# Patient Record
Sex: MALE | Race: WHITE | Employment: OTHER | URBAN - METROPOLITAN AREA
[De-identification: names, ages, dates, MRNs, and addresses within clinical notes are randomized per-mention and may not be internally consistent; named-entity substitution may affect disease eponyms.]

---

## 2024-08-29 ENCOUNTER — TELEPHONE (OUTPATIENT)
Dept: WOUND CARE | Facility: HOSPITAL | Age: 74
End: 2024-08-29

## 2024-08-29 ENCOUNTER — OFFICE VISIT (OUTPATIENT)
Dept: WOUND CARE | Facility: HOSPITAL | Age: 74
End: 2024-08-29
Attending: NURSE PRACTITIONER
Payer: MEDICARE

## 2024-08-29 VITALS
RESPIRATION RATE: 18 BRPM | WEIGHT: 170 LBS | BODY MASS INDEX: 26.68 KG/M2 | SYSTOLIC BLOOD PRESSURE: 139 MMHG | HEART RATE: 81 BPM | HEIGHT: 67 IN | OXYGEN SATURATION: 93 % | TEMPERATURE: 99 F | DIASTOLIC BLOOD PRESSURE: 77 MMHG

## 2024-08-29 DIAGNOSIS — E11.65 UNCONTROLLED TYPE 2 DIABETES MELLITUS WITH HYPERGLYCEMIA, WITH LONG-TERM CURRENT USE OF INSULIN (HCC): ICD-10-CM

## 2024-08-29 DIAGNOSIS — L97.529 DIABETIC ULCER OF LEFT GREAT TOE (HCC): ICD-10-CM

## 2024-08-29 DIAGNOSIS — L97.922 NON-PRESSURE CHRONIC ULCER OF LEFT LOWER LEG WITH FAT LAYER EXPOSED (HCC): Primary | ICD-10-CM

## 2024-08-29 DIAGNOSIS — Z79.4 UNCONTROLLED TYPE 2 DIABETES MELLITUS WITH HYPERGLYCEMIA, WITH LONG-TERM CURRENT USE OF INSULIN (HCC): ICD-10-CM

## 2024-08-29 DIAGNOSIS — E11.621 DIABETIC ULCER OF LEFT GREAT TOE (HCC): ICD-10-CM

## 2024-08-29 PROCEDURE — 99204 OFFICE O/P NEW MOD 45 MIN: CPT | Performed by: NURSE PRACTITIONER

## 2024-08-29 PROCEDURE — 97597 DBRDMT OPN WND 1ST 20 CM/<: CPT | Performed by: NURSE PRACTITIONER

## 2024-08-29 RX ORDER — ROSUVASTATIN CALCIUM 40 MG/1
40 TABLET, COATED ORAL NIGHTLY
COMMUNITY
Start: 2024-08-17

## 2024-08-29 RX ORDER — AMOXICILLIN AND CLAVULANATE POTASSIUM 500; 125 MG/1; MG/1
1 TABLET, FILM COATED ORAL 2 TIMES DAILY WITH MEALS
COMMUNITY
End: 2024-08-29 | Stop reason: ALTCHOICE

## 2024-08-29 RX ORDER — INSULIN LISPRO 100 [IU]/ML
8 INJECTION, SOLUTION INTRAVENOUS; SUBCUTANEOUS 3 TIMES DAILY
COMMUNITY
Start: 2024-08-17

## 2024-08-29 RX ORDER — PEN NEEDLE, DIABETIC 32GX 5/32"
1 NEEDLE, DISPOSABLE MISCELLANEOUS 4 TIMES DAILY
COMMUNITY
Start: 2024-08-17

## 2024-08-29 RX ORDER — CLOPIDOGREL BISULFATE 75 MG/1
75 TABLET ORAL DAILY
COMMUNITY
Start: 2024-08-17

## 2024-08-29 RX ORDER — AMLODIPINE BESYLATE 5 MG/1
5 TABLET ORAL DAILY
COMMUNITY
Start: 2024-08-17

## 2024-08-29 RX ORDER — LOSARTAN POTASSIUM AND HYDROCHLOROTHIAZIDE 25; 100 MG/1; MG/1
1 TABLET ORAL DAILY
COMMUNITY
Start: 2024-08-17

## 2024-08-29 RX ORDER — GABAPENTIN 100 MG/1
100 CAPSULE ORAL 3 TIMES DAILY
COMMUNITY
Start: 2024-08-06

## 2024-08-29 RX ORDER — ERGOCALCIFEROL 1.25 MG/1
50000 CAPSULE, LIQUID FILLED ORAL WEEKLY
COMMUNITY
Start: 2024-08-17

## 2024-08-29 RX ORDER — INSULIN GLARGINE 100 [IU]/ML
66 INJECTION, SOLUTION SUBCUTANEOUS DAILY
COMMUNITY

## 2024-08-29 NOTE — PROGRESS NOTES
Chief Complaint   Patient presents with    Wound Care     Left lateral ankle. Started as a blister then got worse.   Pt bs are at 250     HPI:   8/29/24: 74 yr old male here today with his daughter for evaluation of left lateral ankle wounds. They are [place] speaking, language line was used for translation. Reports wounds have been present for at least 6 months. He went to [facility] 8/6/24 for evaluation of the wounds. He was treated for wound infection with a course of Augmentin 875mg. Pt says the redness around the wound has improved since then. He was previously receiving treatment for the wound in [place]. He has tried medi-honey and what appears to be the equivalent to Santyl. Reports both treatments caused increased pain at the wound site. Since he was at the ED, his daughter has been cleaning with an OTC wound cleanser and wounds have been left open to air. He has been showering regularly. Additionally, he has wound on his left great toe that they think has healed, occasionally she notices a drop of drainage. Pt reports he currently has no pain at the wound site, but will experience sharp pain at the wound site at night time. He was given Gabapentin by [facility], but this has not helped. He denies known history of stroke, blood clots, CHF, PAD, Afib. Reports he takes Plavix for poor circulation. Reports taking Insulin as prescribed.      Medical Problems:  T2DM (uncontrolled)  HTN  Hyperlipidemia    Pertinent Labs:     Ref Range & Units 8/5/24 2200   WBC  3.5 - 10.5 K/UL 5.1   RBC  4.20 - 5.80 M/UL 4.65   HGB  13.0 - 17.5 GM/DL 13.5   HCT  38.0 - 54.0 % 40.9     BUN  7 - 22 MG/DL 25 High    CREATININE  0.6 - 1.4 MG/DL 1.07   GLUCOSE  70 - 100 MG/ High      ESTIMATED GFR  >59 ML/MIN/1.73M2 73     SED RATE  0 - 25 MM 19     C REACTIVE PROTEIN  <8.1 MG/L 2.0     Outside Imaging:  EXAM: AP and lateral views of the left tibia and fibula   DATE: 8/5/2024 11:49 PM   CLINICAL INDICATION: ulceration two months,  treated in [place], seems worse to   family.   COMPARISON: None.   FINDINGS:   Bones:There is no acute fracture or dislocation.   Joints: Joint spaces are intact without degenerative proliferation.   Soft Tissues: Soft tissue ulceration over the lateral aspect of the distal lower   extremity.   IMPRESSION:   No acute fracture or dislocation of the left tibia or fibula.  No osseous   erosion.  Soft tissue ulceration distally.     EXAM: Non-Standing AP, lateral and oblique views of the left foot   DATE: 8/5/2024 11:49 PM   CLINICAL INDICATION: ulcerations left foot at base of great toe and left lower   leg distally/laterally.    COMPARISON: None.   FINDINGS:   Bones: There is no acute fracture or dislocation.    Joints: Joint spaces show mild degenerative proliferation.   Soft Tissues: Focal soft tissue swelling at the medial aspect of the hallux   distal phalanx.     IMPRESSION:   No acute fracture-dislocation left foot.  No osseous erosion.  Soft tissue   swelling.       Current Outpatient Medications:     amLODIPine 5 MG Oral Tab, Take 1 tablet (5 mg total) by mouth daily., Disp: , Rfl:     clopidogrel 75 MG Oral Tab, Take 1 tablet (75 mg total) by mouth daily., Disp: , Rfl:     ergocalciferol 1.25 MG (37698 UT) Oral Cap, Take 1 capsule (50,000 Units total) by mouth once a week., Disp: , Rfl:     gabapentin 100 MG Oral Cap, Take 1 capsule (100 mg total) by mouth 3 (three) times daily., Disp: , Rfl:     LANTUS SOLOSTAR 100 UNIT/ML Subcutaneous Solution Pen-injector, Inject 66 Units into the skin daily., Disp: , Rfl:     Insulin Lispro, 1 Unit Dial, 100 UNIT/ML Subcutaneous Solution Pen-injector, Inject 8 Units as directed 3 (three) times daily., Disp: , Rfl:     BD PEN NEEDLE SHANNON 2ND GEN 32G X 4 MM Does not apply Misc, 1 each 4 (four) times daily., Disp: , Rfl:     losartan-hydroCHLOROthiazide 100-25 MG Oral Tab, Take 1 tablet by mouth daily., Disp: , Rfl:     rosuvastatin 40 MG Oral Tab, Take 1 tablet (40 mg total)  by mouth nightly., Disp: , Rfl:     Allergies:  No Known Allergies     REVIEW OF SYSTEMS:   Review of Systems   Constitutional:  Negative for activity change, appetite change, chills, fatigue and fever.   Respiratory:  Negative for shortness of breath.    Cardiovascular:  Negative for chest pain.   Gastrointestinal:  Negative for abdominal pain.   Musculoskeletal:  Positive for gait problem (ambulates with cane).   Skin:  Positive for wound. Negative for rash.   Neurological:  Negative for dizziness, numbness and headaches.   Psychiatric/Behavioral:          Daughter reports he sometimes gets depressed by non-healing wound     PHYSICAL EXAM:   /77   Pulse 81   Temp 98.8 °F (37.1 °C)   Resp 18   Ht 67\"   Wt 170 lb   SpO2 93%   BMI 26.63 kg/m²    Estimated body mass index is 26.63 kg/m² as calculated from the following:    Height as of this encounter: 67\".    Weight as of this encounter: 170 lb.   Vital signs reviewed.Appears stated age, well groomed.    Physical Exam  Constitutional:       General: He is not in acute distress.     Appearance: Normal appearance. He is not ill-appearing.   HENT:      Head: Normocephalic and atraumatic.   Cardiovascular:      Rate and Rhythm: Normal rate.      Pulses:           Dorsalis pedis pulses are 2+ on the right side and 2+ on the left side.        Posterior tibial pulses are 2+ on the right side and 2+ on the left side.      Comments: 24 handheld doppler findings of biphasic wave sounds at right DP and PT pulses, monophasic wave sounds left DP and PT.    Pulmonary:      Effort: Pulmonary effort is normal.   Musculoskeletal:      Right lower leg: Edema present.      Left lower le+ Pitting Edema present.   Skin:     General: Skin is warm and dry.      Capillary Refill: Capillary refill takes 2 to 3 seconds.      Findings: Wound (left lateral ankle, left 1st toe; see wound care flowsheet) present. No rash.   Neurological:      General: No focal deficit  present.      Mental Status: He is alert and oriented to person, place, and time.   Psychiatric:         Mood and Affect: Mood normal.         Behavior: Behavior normal.       Wound 08/29/24 3 Ankle Left;Lateral;Proximal (Active)   Date First Assessed/Time First Assessed: 08/29/24 1106    Wound Number (Wound Clinic Only): 3  Location: Ankle  Wound Location Orientation: Left;Lateral;Proximal      Assessments 8/29/2024 11:16 AM   Wound Image      Site Assessment Moist;Yellow   Closure Not approximated   Drainage Amount Scant   Drainage Description Yellow   Treatments Cleansed;Wound ;Saline   Dressing Joanne;Tape   Dressing Changed New   Dressing Status Dry;Intact;Clean   Wound Length (cm) 1.9 cm   Wound Width (cm) 1.6 cm   Wound Surface Area (cm^2) 3.04 cm^2   Wound Depth (cm) 0 cm   Wound Volume (cm^3) 0 cm^3   Margins Attached edges   Non-staged Wound Description Full thickness   Angelica-wound Assessment Clean;Dry;Blanchable erythema;Painful   Wound Bed Granulation (%) 0 %   Wound Bed Epithelium (%) 0 %   Wound Bed Slough (%) 100 %   Wound Bed Eschar (%) 0 %   Wound Bed Fibrin (%) 0 %   State of Healing Non-healing   Wound Odor None       No associated orders.       Wound 08/29/24 1 Ankle Left;Lateral;Distal (Active)   Date First Assessed/Time First Assessed: 08/29/24 1115    Wound Number (Wound Clinic Only): 1  Location: (c) Ankle  Wound Location Orientation: Left;Lateral;Distal      Assessments 8/29/2024 11:16 AM   Wound Image      Site Assessment Moist;Yellow;Pink;Red   Closure Not approximated   Drainage Amount Small   Drainage Description Yellow   Treatments Cleansed;Saline;Wound    Dressing Joanne;Tape   Dressing Changed New   Dressing Status Clean;Dry;Intact   Wound Length (cm) 1.4 cm   Wound Width (cm) 1.3 cm   Wound Surface Area (cm^2) 1.82 cm^2   Wound Depth (cm) 0.2 cm   Wound Volume (cm^3) 0.364 cm^3   Margins Well-defined edges   Non-staged Wound Description Full thickness   Angelica-wound  Assessment Dry;Intact;Blanchable erythema;Painful   Wound Granulation Tissue Pink;Red   Wound Bed Granulation (%) 100 %   Wound Bed Epithelium (%) 0 %   Wound Bed Slough (%) 20 %   Wound Bed Eschar (%) 0 %   Wound Bed Fibrin (%) 0 %   State of Healing Non-healing   Wound Odor None       No associated orders.       Wound 08/29/24 2 Toe (Comment which one) Left;Dorsal (Active)   Date First Assessed/Time First Assessed: 08/29/24 1132    Wound Number (Wound Clinic Only): 2  Location: Toe (Comment which one)  Wound Location Orientation: Left;Dorsal      Assessments 8/29/2024 11:16 AM   Wound Image     Site Assessment Yellow;Moist;Pink   Closure Not approximated   Drainage Amount Small   Drainage Description Yellow   Treatments Cleansed;Saline;Wound    Dressing Xeroform;Bandaid   Dressing Changed New   Dressing Status Clean;Dry;Intact   Wound Length (cm) 0.1 cm   Wound Width (cm) 0.1 cm   Wound Surface Area (cm^2) 0.01 cm^2   Wound Depth (cm) 0.1 cm   Wound Volume (cm^3) 0.001 cm^3   Margins Attached edges   Angelica-wound Assessment Clean;Dry;Maceration   Wound Granulation Tissue Pink   Wound Bed Granulation (%) 80 %   Wound Bed Epithelium (%) 0 %   Wound Bed Slough (%) 20 %   Wound Bed Eschar (%) 0 %   Wound Bed Fibrin (%) 0 %   State of Healing Non-healing   Wound Odor None       No associated orders.       Compression Wrap 08/29/24 Leg Left;Lower (Active)   Placement Date: 08/29/24   Location: Leg  Wound Location Orientation: Left;Lower      Assessments 8/29/2024 11:16 AM   Response to Treatment Well tolerated   Compression Layers Single   Compression Product Type Tubigrip/Spanda   Dressing Applied Yes   Compression Wrap Location Toes to Knee   Compression Wrap Status Clean;Dry;Intact       No associated orders.        Lower Extremity Measurements   Calf Ankle Foot Heel to Knee Knee to Thigh   Right                                Left Left Calf from:: Heel  Calf Left cm:: 36.5 Left Ankle from:: Heel  Left Ankle  cm:: 23    Left Foot from:: Great toe  Left Foot cm:: 24.5 Left Heel to Knee: 43          Debridement Left;Lateral;Proximal Ankle   Wound 08/29/24 3 Ankle Left;Lateral;Proximal    Performed by: Melany Hathaway APRN  Authorized by: Melany Hathaway APRN      Consent   Consent obtained? verbal  Consent given by: patient  Risks discussed? procedural risks discussed  Immediately prior to the procedure a time out was called and the performing provider verified the correct patient, procedure, equipment, support staff, and site/side marked as required.    Debridement Details  Performed by: NP  Debridement type: conservative sharp  Pain control: benzocaine 20%  Pain control administration type: topical    Pre-debridement measurements  Length (cm): 1.9  Width (cm): 1.6  Depth (cm): 0.1  Surface Area (cm^2): 3.04    Post-debridement measurements  Length (cm): 1.9  Width (cm): 1.6  Depth (cm): 0.1  Percent debrided: 100%  Surface Area (cm^2): 3.04  Area Debrided (cm^2): 3.04  Volume (cm^3): 0.3    Devitalized tissue debrided: biofilm, fibrin and slough  Instrument(s) utilized: blade and forceps  Bleeding: small  Hemostasis obtained with: pressure  Procedural pain (0-10): 0  Post-procedural pain: 2   Response to treatment: procedure was tolerated well    Debridement Left;Lateral;Distal Ankle   Wound 08/29/24 1 Ankle Left;Lateral;Distal    Performed by: Melany Hathaway APRN  Authorized by: Melany Hathaway APRN      Consent   Consent obtained? verbal  Consent given by: patient  Risks discussed? procedural risks discussed  Time out called at 8/29/2024 2:16 PM  Immediately prior to the procedure a time out was called and the performing provider verified the correct patient, procedure, equipment, support staff, and site/side marked as required.    Debridement Details  Performed by: NP  Debridement type: conservative sharp  Pain control: benzocaine 20%  Pain control administration type: topical    Pre-debridement  measurements  Length (cm): 1.4  Width (cm): 1.3  Depth (cm): 0.2  Surface Area (cm^2): 1.82    Post-debridement measurements  Length (cm): 1.4  Width (cm): 1.3  Depth (cm): 0.2  Percent debrided: 100%  Surface Area (cm^2): 1.82  Area Debrided (cm^2): 1.82  Volume (cm^3): 0.36    Devitalized tissue debrided: biofilm, fibrin and slough  Instrument(s) utilized: blade and forceps  Bleeding: none  Hemostasis obtained with: not applicable  Procedural pain (0-10): 0  Post-procedural pain: 0   Response to treatment: procedure was tolerated well      ASSESSMENT AND PLAN:      1. Open wound of left lower leg, initial encounter    2. Diabetic ulcer of left great toe (HCC)    3. Uncontrolled type 2 diabetes mellitus with hyperglycemia, with long-term current use of insulin (HCC)    Chronic full thickness wounds on lateral left lower leg, present for ~6 months. Wounds are moist with slough and granulation present in wound bed. Periwound with blanchable erythema, edema.  Wounds were conservatively debrided (see procedure notes). Following debridement wounds were cleansed with Vashe. There are no s/sx of skin infection--pt was advised to monitor for these and to go to ED if he develops any sx. Hindering factors for wound healing include T2DM, chronic wound, peripheral edema.   Discussed the process of wound healing, creating/maintaining a moist, clean environment for wound healing.   Wound Care:  -Cleanse wound with wound cleanser  -Zinc paste to periwound  -Enluxtra (primary), rolled gauze (secondary) secured w/tape  -Medigrip E for edema management    Handout provided on nutrition for wound healing, encourages reduced carbohydrate intake, increased protein intake, and healthy diet. Recommended increase vitamin intake (either with foods or vitamin supplements), need vitamin A, Bs, C, D and zinc for wound healing.   Discussed importance of improving blood sugar control and how elevated BG delays wound healing  Risks, benefits, and  alternatives of current treatment plan discussed in detail.  Questions and concerns addressed. Red flags to RTC or ED reviewed.  Patient (or parent) agrees to plan.      Return in about 1 week (around 9/5/2024) for APN visit x 30 mins.    I spent 60 minutes with the patient. This time included:  preparing to see the patient (eg, review notes and recent diagnostics), seeing the patient, performing a medically appropriate examination and/or evaluation, counseling and educating the patient, and documenting in the record.    NOTE TO PATIENT: The 21st Century Cures Act makes clinical notes like these available to patients in the interest of transparency. Clinical notes are medical documents used by physicians and care providers to communicate with each other. These documents include medical language and terminology, abbreviations, and treatment information that may sound technical and at times possibly unfamiliar. In addition, at times, the verbiage may appear blunt or direct. These documents are one tool providers use to communicate relevant information and clinical opinions of the care providers in a way that allows common understanding of the clinical context.

## 2024-09-05 ENCOUNTER — OFFICE VISIT (OUTPATIENT)
Dept: WOUND CARE | Facility: HOSPITAL | Age: 74
End: 2024-09-05
Attending: NURSE PRACTITIONER
Payer: MEDICAID

## 2024-09-05 VITALS
SYSTOLIC BLOOD PRESSURE: 152 MMHG | RESPIRATION RATE: 18 BRPM | HEART RATE: 79 BPM | DIASTOLIC BLOOD PRESSURE: 91 MMHG | TEMPERATURE: 98 F | OXYGEN SATURATION: 94 %

## 2024-09-05 DIAGNOSIS — L97.922 NON-PRESSURE CHRONIC ULCER OF LEFT LOWER LEG WITH FAT LAYER EXPOSED (HCC): Primary | ICD-10-CM

## 2024-09-05 DIAGNOSIS — Z79.4 UNCONTROLLED TYPE 2 DIABETES MELLITUS WITH HYPERGLYCEMIA, WITH LONG-TERM CURRENT USE OF INSULIN (HCC): ICD-10-CM

## 2024-09-05 DIAGNOSIS — E11.65 UNCONTROLLED TYPE 2 DIABETES MELLITUS WITH HYPERGLYCEMIA, WITH LONG-TERM CURRENT USE OF INSULIN (HCC): ICD-10-CM

## 2024-09-05 PROCEDURE — 99213 OFFICE O/P EST LOW 20 MIN: CPT

## 2024-09-05 PROCEDURE — 97597 DBRDMT OPN WND 1ST 20 CM/<: CPT | Performed by: NURSE PRACTITIONER

## 2024-09-05 NOTE — PROGRESS NOTES
Patient ID: Guillermo White is a 74 year old male.    Debridement Left;Lateral;Distal Ankle   Wound 08/29/24 1 Ankle Left;Lateral;Distal    Performed by: Melany Hathaway APRN  Authorized by: Melany Hathaway APRN      Consent   Consent obtained? verbal  Consent given by: patient  Risks discussed? procedural risks discussed  Time out called at 9/5/2024 2:00 PM  Immediately prior to the procedure a time out was called and the performing provider verified the correct patient, procedure, equipment, support staff, and site/side marked as required.    Debridement Details  Performed by: NP  Debridement type: conservative sharp  Pain control: benzocaine 20%  Pain control administration type: topical    Pre-debridement measurements  Length (cm): 1.5  Width (cm): 1.3  Depth (cm): 0.2  Surface Area (cm^2): 1.95    Post-debridement measurements  Length (cm): 1.5  Width (cm): 1.3  Depth (cm): 0.2  Percent debrided: 30%  Surface Area (cm^2): 1.95  Area Debrided (cm^2): 0.59  Volume (cm^3): 0.39    Devitalized tissue debrided: slough  Instrument(s) utilized: curette  Bleeding: small  Hemostasis obtained with: pressure  Procedural pain (0-10): 6  Post-procedural pain: 7   Response to treatment: procedure was tolerated well    Debridement Left;Lateral;Proximal Ankle   Wound 08/29/24 3 Ankle Left;Lateral;Proximal    Performed by: Melany Hathaway APRN  Authorized by: Melany Hathaway APRN      Consent   Consent obtained? verbal  Consent given by: patient  Risks discussed? procedural risks discussed  Time out called at 9/5/2024 2:00 PM  Immediately prior to the procedure a time out was called and the performing provider verified the correct patient, procedure, equipment, support staff, and site/side marked as required.    Debridement Details  Performed by: NP  Debridement type: conservative sharp  Pain control: benzocaine 20%  Pain control administration type: topical    Pre-debridement measurements  Length (cm): 2.1  Width  (cm): 1.5  Depth (cm): 0.2  Surface Area (cm^2): 3.15    Post-debridement measurements  Length (cm): 2.1  Width (cm): 1.5  Depth (cm): 0.2  Percent debrided: 30%  Surface Area (cm^2): 3.15  Area Debrided (cm^2): 0.95  Volume (cm^3): 0.63    Devitalized tissue debrided: slough  Instrument(s) utilized: curette  Bleeding: small  Hemostasis obtained with: pressure  Procedural pain (0-10): 7  Post-procedural pain: 7   Response to treatment: procedure was tolerated well

## 2024-09-05 NOTE — PROGRESS NOTES
Chief Complaint   Patient presents with    Wound Recheck     Left lateral ankle. Pt Bs are at 120      HPI:   9/5/24: F/u visit on left lateral ankle wounds. Language line translated for visit. Pt tolerated dressing well, but felt dressing was tight. He says his pain varies to little pain up to 7/10. No change in health or new concerns.    8/29/24: 74 yr old male here today with his daughter for evaluation of left lateral ankle wounds. They are [place] speaking, language line was used for translation. Reports wounds have been present for at least 6 months. He went to [facility] ED 8/6/24 for evaluation of the wounds. He was treated for wound infection with a course of Augmentin 875mg. Pt says the redness around the wound has improved since then. He was previously receiving treatment for the wound in [employer]. He has tried medi-honey and what appears to be the equivalent to Santyl. Reports both treatments caused increased pain at the wound site. Since he was at the ED, his daughter has been cleaning with an OT wound cleanser and wounds have been left open to air. He has been s howering regularly. Additionally, he has wound on his left great toe that they think has healed, occasionally she notices a drop of drainage. Pt reports he currently has no pain at the wound site, but will experience sharp pain at the wound site at night time. He was given Gabapentin by [facility], but this has not helped. He denies known history of stroke, blood clots, CHF, PAD, Afib. Reports he takes Plavix for poor circulation. Reports taking Insulin as prescribed.      Medical Problems:  T2DM (uncontrolled)  HTN  Hyperlipidemia    Pertinent Labs:     Ref Range & Units 8/5/24 2200   WBC  3.5 - 10.5 K/UL 5.1   RBC  4.20 - 5.80 M/UL 4.65   HGB  13.0 - 17.5 GM/DL 13.5   HCT  38.0 - 54.0 % 40.9     BUN  7 - 22 MG/DL 25 High    CREATININE  0.6 - 1.4 MG/DL 1.07   GLUCOSE  70 - 100 MG/ High      ESTIMATED GFR  >59 ML/MIN/1.73M2 73     SED RATE  0 - 25  MM 19     C REACTIVE PROTEIN  <8.1 MG/L 2.0     Outside Imaging:  EXAM: AP and lateral views of the left tibia and fibula   DATE: 8/5/2024 11:49 PM   CLINICAL INDICATION: ulceration two months, treated in [place], seems worse to   family.   COMPARISON: None.   FINDINGS:   Bones:There is no acute fracture or dislocation.   Joints: Joint spaces are intact without degenerative proliferation.   Soft Tissues: Soft tissue ulceration over the lateral aspect of the distal lower   extremity.   IMPRESSION:   No acute fracture or dislocation of the left tibia or fibula.  No osseous   erosion.  Soft tissue ulceration distally.     EXAM: Non-Standing AP, lateral and oblique views of the left foot   DATE: 8/5/2024 11:49 PM   CLINICAL INDICATION: ulcerations left foot at base of great toe and left lower   leg distally/laterally.    COMPARISON: None.   FINDINGS:   Bones: There is no acute fracture or dislocation.    Joints: Joint spaces show mild degenerative proliferation.   Soft Tissues: Focal soft tissue swelling at the medial aspect of the hallux   distal phalanx.     IMPRESSION:   No acute fracture-dislocation left foot.  No osseous erosion.  Soft tissue   swelling.       Current Outpatient Medications:     amLODIPine 5 MG Oral Tab, Take 1 tablet (5 mg total) by mouth daily., Disp: , Rfl:     clopidogrel 75 MG Oral Tab, Take 1 tablet (75 mg total) by mouth daily., Disp: , Rfl:     ergocalciferol 1.25 MG (07441 UT) Oral Cap, Take 1 capsule (50,000 Units total) by mouth once a week., Disp: , Rfl:     gabapentin 100 MG Oral Cap, Take 1 capsule (100 mg total) by mouth 3 (three) times daily., Disp: , Rfl:     LANTUS SOLOSTAR 100 UNIT/ML Subcutaneous Solution Pen-injector, Inject 66 Units into the skin daily., Disp: , Rfl:     Insulin Lispro, 1 Unit Dial, 100 UNIT/ML Subcutaneous Solution Pen-injector, Inject 8 Units as directed 3 (three) times daily., Disp: , Rfl:     BD PEN NEEDLE SHANNON 2ND GEN 32G X 4 MM Does not apply Misc, 1 each  4 (four) times daily., Disp: , Rfl:     losartan-hydroCHLOROthiazide 100-25 MG Oral Tab, Take 1 tablet by mouth daily., Disp: , Rfl:     rosuvastatin 40 MG Oral Tab, Take 1 tablet (40 mg total) by mouth nightly., Disp: , Rfl:     Allergies:  No Known Allergies     REVIEW OF SYSTEMS:   Review of Systems   Constitutional:  Negative for activity change, appetite change, chills, fatigue and fever.   Respiratory:  Negative for shortness of breath.    Cardiovascular:  Negative for chest pain.   Gastrointestinal:  Negative for abdominal pain.   Musculoskeletal:  Positive for gait problem (ambulates with cane).   Skin:  Positive for wound. Negative for rash.   Neurological:  Negative for dizziness, numbness and headaches.   Psychiatric/Behavioral:          Daughter reports he sometimes gets depressed by non-healing wound     PHYSICAL EXAM:   BP (!) 152/91   Pulse 79   Temp 98.1 °F (36.7 °C)   Resp 18   SpO2 94%    Estimated body mass index is 26.63 kg/m² as calculated from the following:    Height as of 24: 67\".    Weight as of 24: 170 lb.   Vital signs reviewed.Appears stated age, well groomed.    Physical Exam  Constitutional:       General: He is not in acute distress.     Appearance: Normal appearance. He is not ill-appearing.   HENT:      Head: Normocephalic and atraumatic.   Cardiovascular:      Rate and Rhythm: Normal rate.      Pulses:           Dorsalis pedis pulses are 2+ on the right side and 2+ on the left side.        Posterior tibial pulses are 2+ on the right side and 2+ on the left side.      Comments: 24 handheld doppler findings of biphasic wave sounds at right DP and PT pulses, monophasic wave sounds left DP and PT.    Pulmonary:      Effort: Pulmonary effort is normal.   Musculoskeletal:      Right lower leg: Edema present.      Left lower le+ Pitting Edema present.   Skin:     General: Skin is warm and dry.      Capillary Refill: Capillary refill takes 2 to 3 seconds.       Findings: Wound (left lateral ankle, left 1st toe; see wound care flowsheet) present. No rash.   Neurological:      General: No focal deficit present.      Mental Status: He is alert and oriented to person, place, and time.   Psychiatric:         Mood and Affect: Mood normal.         Behavior: Behavior normal.       Wound 08/29/24 3 Ankle Left;Lateral;Proximal (Active)   Date First Assessed/Time First Assessed: 08/29/24 1106    Wound Number (Wound Clinic Only): 3  Location: Ankle  Wound Location Orientation: Left;Lateral;Proximal      Assessments 8/29/2024 11:16 AM 9/5/2024  1:29 PM   Wound Image        Site Assessment Moist;Yellow Moist;Yellow;Red   Closure Not approximated Not approximated   Drainage Amount Scant Scant   Drainage Description Yellow Yellow   Treatments Cleansed;Wound ;Saline Cleansed;Saline;Vashe;Honey Gel   Dressing Joanne;Tape 4x4s;Gauze;Joanne;Tape   Dressing Changed New Changed   Dressing Status Dry;Intact;Clean Dry;Intact;Clean   Wound Length (cm) 1.9 cm 2.1 cm   Wound Width (cm) 1.6 cm 1.5 cm   Wound Surface Area (cm^2) 3.04 cm^2 3.15 cm^2   Wound Depth (cm) 0.1 cm 0.2 cm   Wound Volume (cm^3) 0.304 cm^3 0.63 cm^3   Wound Healing % -- -107   Margins Attached edges Attached edges   Non-staged Wound Description Full thickness Full thickness   Angelica-wound Assessment Clean;Dry;Blanchable erythema;Painful Clean;Dry;Blanchable erythema;Painful   Wound Granulation Tissue -- Red   Wound Bed Granulation (%) 10 % 10 %   Wound Bed Epithelium (%) 0 % 0 %   Wound Bed Slough (%) 90 % 90 %   Wound Bed Eschar (%) 0 % 0 %   Wound Bed Fibrin (%) 0 % 0 %   State of Healing Non-healing Non-healing   Wound Odor None None       Active Orders   Date Order Priority Status Authorizing Provider   08/29/24 1421 OP Wound Dressing Routine Active Melany Hathaway APRN     - Cleansing:    Cleanse with normal saline or wound cleanser     - Dressing:    Enluxtra humidifiber     - Dressing:    Conforming roll     -  Frequency:    Change dressing two times per week       Inactive Orders   Date Order Priority Status Authorizing Provider   09/05/24 1401 Debridement Left;Lateral;Proximal Ankle Routine Completed Melany Hathaway APRN   08/29/24 1415 Debridement Left;Lateral;Proximal Ankle Routine Completed Melany Hathaway APRN       Wound 08/29/24 1 Ankle Left;Lateral;Distal (Active)   Date First Assessed/Time First Assessed: 08/29/24 1115    Wound Number (Wound Clinic Only): 1  Location: (c) Ankle  Wound Location Orientation: Left;Lateral;Distal      Assessments 8/29/2024 11:16 AM 9/5/2024  1:29 PM   Wound Image        Site Assessment Moist;Yellow;Pink;Red Moist;Yellow;Pink   Closure Not approximated Not approximated   Drainage Amount Small Small   Drainage Description Yellow Yellow   Treatments Cleansed;Saline;Wound  Cleansed;Saline;Vashe;Honey Gel   Dressing Joanne;Tape 4x4s;Gauze;Joanne;Tape   Dressing Changed New Changed   Dressing Status Clean;Dry;Intact Clean;Dry;Intact   Wound Length (cm) 1.4 cm 1.5 cm   Wound Width (cm) 1.3 cm 1.3 cm   Wound Surface Area (cm^2) 1.82 cm^2 1.95 cm^2   Wound Depth (cm) 0.2 cm 0.2 cm   Wound Volume (cm^3) 0.364 cm^3 0.39 cm^3   Wound Healing % -- -7   Margins Well-defined edges Well-defined edges   Non-staged Wound Description Full thickness Full thickness   Angelica-wound Assessment Dry;Intact;Blanchable erythema;Painful Dry;Intact;Blanchable erythema;Painful   Wound Granulation Tissue Pink;Red Pink   Wound Bed Granulation (%) 100 % 50 %   Wound Bed Epithelium (%) 0 % 0 %   Wound Bed Slough (%) 20 % 50 %   Wound Bed Eschar (%) 0 % 0 %   Wound Bed Fibrin (%) 0 % 0 %   State of Healing Non-healing Non-healing   Wound Odor None None       Active Orders   Date Order Priority Status Authorizing Provider   08/29/24 1421 OP Wound Dressing Routine Active Melany Hathaway APRN     - Cleansing:    Cleanse with normal saline or wound cleanser     - Dressing:    Enluxtra humidifiber     - Dressing:     Conforming roll     - Frequency:    Change dressing two times per week       Inactive Orders   Date Order Priority Status Authorizing Provider   09/05/24 1400 Debridement Left;Lateral;Distal Ankle Routine Completed Melany Hathaway APRN   08/29/24 1416 Debridement Left;Lateral;Distal Ankle Routine Completed Melany Hathaway APRN       Wound 08/29/24 2 Toe (Comment which one) Left;Dorsal (Active)   Date First Assessed/Time First Assessed: 08/29/24 1132    Wound Number (Wound Clinic Only): 2  Location: Toe (Comment which one)  Wound Location Orientation: Left;Dorsal      Assessments 8/29/2024 11:16 AM 9/5/2024  1:29 PM   Wound Image       Site Assessment Yellow;Moist;Pink Clean;Dry;Intact   Closure Not approximated Approximated   Drainage Amount Small None   Drainage Description Yellow --   Treatments Cleansed;Saline;Wound  Cleansed;Saline;Wound    Dressing Xeroform;Bandaid Open to air   Dressing Changed New --   Dressing Status Clean;Dry;Intact --   Wound Length (cm) 0.1 cm 0 cm   Wound Width (cm) 0.1 cm 0 cm   Wound Surface Area (cm^2) 0.01 cm^2 0 cm^2   Wound Depth (cm) 0.1 cm 0 cm   Wound Volume (cm^3) 0.001 cm^3 0 cm^3   Wound Healing % -- 100   Margins Attached edges Flat and Intact   Non-staged Wound Description -- Not applicable   Angelica-wound Assessment Clean;Dry;Maceration Dry;Intact;Clean   Wound Granulation Tissue Pink --   Wound Bed Granulation (%) 80 % 0 %   Wound Bed Epithelium (%) 0 % 100 %   Wound Bed Slough (%) 20 % 0 %   Wound Bed Eschar (%) 0 % 0 %   Wound Bed Fibrin (%) 0 % 0 %   State of Healing Non-healing Epithelialized;Closed wound edges   Wound Odor None None       Active Orders   Date Order Priority Status Authorizing Provider   08/29/24 1421 OP Wound Dressing Routine Active Melany Hathaway APRN     - Cleansing:    Cleanse with normal saline or wound cleanser     - Dressing:    xeroform gauze     - Additional Wound Dressing Information:    bandaid     - Frequency:    Change  dressing two times per week       Compression Wrap 08/29/24 Leg Left;Lower (Active)   Placement Date: 08/29/24   Location: Leg  Wound Location Orientation: Left;Lower      Assessments 8/29/2024 11:16 AM 9/5/2024  1:29 PM   Response to Treatment Well tolerated Well tolerated   Compression Layers Single Single   Compression Product Type Tubigrip/Spanda Tubigrip/Spanda   Dressing Applied Yes Yes   Compression Wrap Location Toes to Knee Toes to Knee   Compression Wrap Status Clean;Dry;Intact Clean;Dry;Intact       No associated orders.        Lower Extremity Measurements   Calf Ankle Foot Heel to Knee Knee to Thigh   Right                                Left Left Calf from:: Heel  Calf Left cm:: 37.6 Left Ankle from:: Heel  Left Ankle cm:: 22    Left Foot from:: Great toe  Left Foot cm:: 24.5            Procedures  ASSESSMENT AND PLAN:      1. Non-pressure chronic ulcer of left lower leg with fat layer exposed (HCC)  - US ANKLE BRACHIAL INDEX (CPT=93922); Future    2. Uncontrolled type 2 diabetes mellitus with hyperglycemia, with long-term current use of insulin (HCC)  - US ANKLE BRACHIAL INDEX (CPT=93922); Future    Chronic full thickness wounds on lateral left lower leg, present for ~6 months. Wounds are dry with adherent slough and small amount granulation present in wound bed. Measurements have slightly increased in size. Periwound with blanchable erythema, edema. Wounds were conservatively debrided (see procedure notes). Following debridement wounds were cleansed with Vashe. There are no s/sx of skin infection--pt was advised to monitor for these and to go to ED if he develops any sx. Hindering factors for wound healing include T2DM, chronic wound, peripheral edema.   Wound Care:  -Cleanse wound with wound cleanser  -Medi-honey and gauze dressing, change Q 3 days and PRN  -Medigrip E for edema management    Continue nutrition for wound healing, encourages reduced carbohydrate intake, increased protein intake, and  healthy diet. Recommended increase vitamin intake (either with foods or vitamin supplements), need vitamin A, Bs, C, D and zinc for wound healing.   Continue to work on improving blood sugar control  Risks, benefits, and alternatives of current treatment plan discussed in detail.  Questions and concerns addressed. Red flags to RTC or ED reviewed.  Patient (or parent) agrees to plan.      Return in about 1 week (around 9/12/2024) for APN visit x 30 mins.    I spent 30 minutes with the patient. This time included:  preparing to see the patient (eg, review notes and recent diagnostics), seeing the patient, performing a medically appropriate examination and/or evaluation, counseling and educating the patient, and documenting in the record.    NOTE TO PATIENT: The 21st Century Cures Act makes clinical notes like these available to patients in the interest of transparency. Clinical notes are medical documents used by physicians and care providers to communicate with each other. These documents include medical language and terminology, abbreviations, and treatment information that may sound technical and at times possibly unfamiliar. In addition, at times, the verbiage may appear blunt or direct. These documents are one tool providers use to communicate relevant information and clinical opinions of the care providers in a way that allows common understanding of the clinical context.

## 2024-09-10 NOTE — PROGRESS NOTES
Chief Complaint   Patient presents with    Wound Recheck     Left lateral ankle. Pt Bs were at 120 yesterday     HPI:   9/11/24: F/u L lateral ankle wounds. Language line translating for the visit. He has been applying medi-honey and gauze to the wounds for the past week. Tolerating well. Daughter reports old wound site on left great toe has intermittent clear drainage. Pt reports pain is well controlled. C/o intermittent itching around left ankle. Daughter is asking to have motorized scooter ordered for patient out of concern for tiredness after walking short distances, imbalance and mild SOB. No pain at wound site when walking    9/5/24: F/u visit on left lateral ankle wounds. Language line translated for visit. Pt tolerated dressing well, but felt dressing was tight. He says his pain varies to little pain up to 7/10. No change in health or new concerns.    8/29/24: 74 yr old male here today with his daughter for evaluation of left lateral ankle wounds. They are [place] speaking, language line was used for translation. Reports wounds have been present for at least 6 months. He went to [facility] ED 8/6/24 for evaluation of the wounds. He was treated for wound infection with a course of Augmentin 875mg. Pt says the redness around the wound has improved since then. He was previously receiving treatment for the wound in [place]. He has tried medi-honey and what appears to be the equivalent to Santyl. Reports both treatments caused increased pain at the wound site. Since he was at the ED, his daughter has been cleaning with an OT wound cleanser and wounds have been left open to air. He has been s howering regularly. Additionally, he has wound on his left great toe that they think has healed, occasionally she notices a drop of drainage. Pt reports he currently has no pain at the wound site, but will experience sharp pain at the wound site at night time. He was given Gabapentin by [facility], but this has not helped. He denies  known history of stroke, blood clots, CHF, PAD, Afib. Reports he takes Plavix for poor circulation. Reports taking Insulin as prescribed.      Medical Problems:  T2DM (uncontrolled)  HTN  Hyperlipidemia    Pertinent Labs:     Ref Range & Units 8/5/24 2200   WBC  3.5 - 10.5 K/UL 5.1   RBC  4.20 - 5.80 M/UL 4.65   HGB  13.0 - 17.5 GM/DL 13.5   HCT  38.0 - 54.0 % 40.9     BUN  7 - 22 MG/DL 25 High    CREATININE  0.6 - 1.4 MG/DL 1.07   GLUCOSE  70 - 100 MG/ High      ESTIMATED GFR  >59 ML/MIN/1.73M2 73     SED RATE  0 - 25 MM 19     C REACTIVE PROTEIN  <8.1 MG/L 2.0     Outside Imaging:  EXAM: AP and lateral views of the left tibia and fibula   DATE: 8/5/2024 11:49 PM   CLINICAL INDICATION: ulceration two months, treated in [place], seems worse to   family.   COMPARISON: None.   FINDINGS:   Bones:There is no acute fracture or dislocation.   Joints: Joint spaces are intact without degenerative proliferation.   Soft Tissues: Soft tissue ulceration over the lateral aspect of the distal lower   extremity.   IMPRESSION:   No acute fracture or dislocation of the left tibia or fibula.  No osseous   erosion.  Soft tissue ulceration distally.     EXAM: Non-Standing AP, lateral and oblique views of the left foot   DATE: 8/5/2024 11:49 PM   CLINICAL INDICATION: ulcerations left foot at base of great toe and left lower   leg distally/laterally.    COMPARISON: None.   FINDINGS:   Bones: There is no acute fracture or dislocation.    Joints: Joint spaces show mild degenerative proliferation.   Soft Tissues: Focal soft tissue swelling at the medial aspect of the hallux   distal phalanx.     IMPRESSION:   No acute fracture-dislocation left foot.  No osseous erosion.  Soft tissue   swelling.       Current Outpatient Medications:     amLODIPine 5 MG Oral Tab, Take 1 tablet (5 mg total) by mouth daily., Disp: , Rfl:     clopidogrel 75 MG Oral Tab, Take 1 tablet (75 mg total) by mouth daily., Disp: , Rfl:     ergocalciferol 1.25 MG  (92550 UT) Oral Cap, Take 1 capsule (50,000 Units total) by mouth once a week., Disp: , Rfl:     gabapentin 100 MG Oral Cap, Take 1 capsule (100 mg total) by mouth 3 (three) times daily., Disp: , Rfl:     LANTUS SOLOSTAR 100 UNIT/ML Subcutaneous Solution Pen-injector, Inject 66 Units into the skin daily., Disp: , Rfl:     Insulin Lispro, 1 Unit Dial, 100 UNIT/ML Subcutaneous Solution Pen-injector, Inject 8 Units as directed 3 (three) times daily., Disp: , Rfl:     BD PEN NEEDLE SHANNON 2ND GEN 32G X 4 MM Does not apply Misc, 1 each 4 (four) times daily., Disp: , Rfl:     losartan-hydroCHLOROthiazide 100-25 MG Oral Tab, Take 1 tablet by mouth daily., Disp: , Rfl:     rosuvastatin 40 MG Oral Tab, Take 1 tablet (40 mg total) by mouth nightly., Disp: , Rfl:     Allergies:  No Known Allergies     REVIEW OF SYSTEMS:   Review of Systems   Constitutional:  Negative for activity change, appetite change, chills, fatigue and fever.   Respiratory:  Negative for shortness of breath.    Cardiovascular:  Negative for chest pain.   Gastrointestinal:  Negative for abdominal pain.   Musculoskeletal:  Positive for gait problem (ambulates with cane).   Skin:  Positive for wound. Negative for rash.   Neurological:  Negative for dizziness, numbness and headaches.   Psychiatric/Behavioral:          Daughter reports he sometimes gets depressed by non-healing wound     PHYSICAL EXAM:   /85   Pulse 87   Temp 98.3 °F (36.8 °C)   Resp 18   SpO2 94%    Estimated body mass index is 26.63 kg/m² as calculated from the following:    Height as of 8/29/24: 67\".    Weight as of 8/29/24: 170 lb.   Vital signs reviewed.Appears stated age, well groomed.    Physical Exam  Constitutional:       General: He is not in acute distress.     Appearance: Normal appearance. He is not ill-appearing.   HENT:      Head: Normocephalic and atraumatic.   Cardiovascular:      Rate and Rhythm: Normal rate.      Pulses:           Dorsalis pedis pulses are 2+ on the  right side and 2+ on the left side.        Posterior tibial pulses are 2+ on the right side and 2+ on the left side.      Comments: 24 handheld doppler findings of biphasic wave sounds at right DP and PT pulses, monophasic wave sounds left DP and PT.    Pulmonary:      Effort: Pulmonary effort is normal.   Musculoskeletal:      Right lower leg: Edema present.      Left lower le+ Pitting Edema present.   Skin:     General: Skin is warm and dry.      Capillary Refill: Capillary refill takes 2 to 3 seconds.      Findings: Wound (left lateral ankle, left 1st toe; see wound care flowsheet) present. No rash.   Neurological:      General: No focal deficit present.      Mental Status: He is alert and oriented to person, place, and time.   Psychiatric:         Mood and Affect: Mood normal.         Behavior: Behavior normal.       Wound 24 3 Ankle Left;Lateral;Proximal (Active)   Date First Assessed/Time First Assessed: 24 1106    Wound Number (Wound Clinic Only): 3  Location: Ankle  Wound Location Orientation: Left;Lateral;Proximal      Assessments 2024 11:16 AM 2024  9:33 AM   Wound Image         Site Assessment Moist;Yellow Dry;Yellow;Pink   Closure Not approximated Not approximated   Drainage Amount Scant Scant   Drainage Description Yellow Yellow   Treatments Cleansed;Wound ;Saline Cleansed;Saline;Vashe;Topical (Barrier/Moisturizer/Ointment)   Dressing Joanne;Tape Acticoat;4x4s;Gauze;Joanne;Tape   Dressing Changed New Changed   Dressing Status Dry;Intact;Clean Dry;Intact;Clean   Wound Length (cm) 1.9 cm 2.1 cm   Wound Width (cm) 1.6 cm 1.5 cm   Wound Surface Area (cm^2) 3.04 cm^2 3.15 cm^2   Wound Depth (cm) 0.1 cm 0.1 cm   Wound Volume (cm^3) 0.304 cm^3 0.315 cm^3   Wound Healing % -- -4   Margins Attached edges Attached edges;Well-defined edges   Non-staged Wound Description Full thickness Full thickness   Angelica-wound Assessment Clean;Dry;Blanchable erythema;Painful  Clean;Dry;Blanchable erythema   Wound Granulation Tissue -- Pink   Wound Bed Granulation (%) 10 % 5 %   Wound Bed Epithelium (%) 0 % 0 %   Wound Bed Slough (%) 90 % 95 %   Wound Bed Eschar (%) 0 % 0 %   Wound Bed Fibrin (%) 0 % 0 %   State of Healing Non-healing Non-healing   Wound Odor None None       Active Orders   Date Order Priority Status Authorizing Provider   09/11/24 1201 OP Wound Dressing Routine Active Melany Hathaway APRN     - Cleansing:    Cleanse with normal saline or wound cleanser     - Cleansing:    Cleanse with Vashe     - Dressing:    Acticoat     - Dressing:    Dry gauze     - Dressing:    Conforming roll     - Dressing:    Paper tape     - Additional Wound Dressing Information:    betamethasone to periwound     - Frequency:    Change dressing weekly   09/05/24 1659 OP Wound Dressing Routine Active Melany Hathaway APRN     - Cleansing:    Cleanse with normal saline or wound cleanser     - Cleansing:    Cleanse with Vashe     - Dressing:    Honey gel     - Dressing:    Dry gauze     - Dressing:    Conforming roll     - Dressing:    Paper tape     - Frequency:    Change dressing every three days   08/29/24 1421 OP Wound Dressing Routine Active Melany Hathaway APRN     - Cleansing:    Cleanse with normal saline or wound cleanser     - Dressing:    Enluxtra humidifiber     - Dressing:    Conforming roll     - Frequency:    Change dressing two times per week       Inactive Orders   Date Order Priority Status Authorizing Provider   09/11/24 0955 Debridement Left;Lateral;Proximal Ankle Routine Completed Melany Hathaway APRN   09/05/24 1401 Debridement Left;Lateral;Proximal Ankle Routine Completed Melany Hathaway APRN   08/29/24 1415 Debridement Left;Lateral;Proximal Ankle Routine Completed Melany Hathaway APRN       Wound 08/29/24 1 Ankle Left;Lateral;Distal (Active)   Date First Assessed/Time First Assessed: 08/29/24 1115    Wound Number (Wound Clinic Only): 1  Location: (c) Ankle  Wound  Location Orientation: Left;Lateral;Distal      Assessments 8/29/2024 11:16 AM 9/11/2024  9:33 AM   Wound Image         Site Assessment Moist;Yellow;Pink;Red Dry;Pink;Yellow;Red   Closure Not approximated Not approximated   Drainage Amount Small Scant   Drainage Description Yellow Yellow   Treatments Cleansed;Saline;Wound  Cleansed;Saline;Vashe;Topical (Barrier/Moisturizer/Ointment)   Dressing Joanne;Tape Acticoat;4x4s;Gauze;Joanne;Tape   Dressing Changed New Changed   Dressing Status Clean;Dry;Intact Clean;Dry;Intact   Wound Length (cm) 1.4 cm 1.3 cm   Wound Width (cm) 1.3 cm 1.2 cm   Wound Surface Area (cm^2) 1.82 cm^2 1.56 cm^2   Wound Depth (cm) 0.2 cm 0.2 cm   Wound Volume (cm^3) 0.364 cm^3 0.312 cm^3   Wound Healing % -- 14   Margins Well-defined edges Well-defined edges;Attached edges   Non-staged Wound Description Full thickness Full thickness   Angelica-wound Assessment Dry;Intact;Blanchable erythema;Painful Dry;Intact;Blanchable erythema   Wound Granulation Tissue Pink;Red Red;Pink   Wound Bed Granulation (%) 100 % 80 %   Wound Bed Epithelium (%) 0 % 0 %   Wound Bed Slough (%) 20 % 20 %   Wound Bed Eschar (%) 0 % 0 %   Wound Bed Fibrin (%) 0 % 0 %   State of Healing Non-healing Early/partial granulation   Wound Odor None None       Active Orders   Date Order Priority Status Authorizing Provider   09/11/24 1201 OP Wound Dressing Routine Active Melany Hathaway APRN     - Cleansing:    Cleanse with normal saline or wound cleanser     - Cleansing:    Cleanse with Vashe     - Dressing:    Acticoat     - Dressing:    Dry gauze     - Dressing:    Conforming roll     - Dressing:    Paper tape     - Additional Wound Dressing Information:    betamethasone to periwound     - Frequency:    Change dressing weekly   09/05/24 1659 OP Wound Dressing Routine Active Melany Hathaway APRN     - Cleansing:    Cleanse with normal saline or wound cleanser     - Cleansing:    Cleanse with Vashe     - Dressing:    Honey gel      - Dressing:    Dry gauze     - Dressing:    Conforming roll     - Dressing:    Paper tape     - Frequency:    Change dressing every three days   08/29/24 1421 OP Wound Dressing Routine Active Melany Hathaway APRN     - Cleansing:    Cleanse with normal saline or wound cleanser     - Dressing:    Enluxtra humidifiber     - Dressing:    Conforming roll     - Frequency:    Change dressing two times per week       Inactive Orders   Date Order Priority Status Authorizing Provider   09/11/24 0958 Debridement Left;Lateral;Distal Ankle Routine Completed Melany Hathaway APRN   09/05/24 1400 Debridement Left;Lateral;Distal Ankle Routine Completed Melany Hathaway APRN   08/29/24 1416 Debridement Left;Lateral;Distal Ankle Routine Completed Melany Hathaway APRN       Wound 08/29/24 2 Toe (Comment which one) Left;Dorsal (Active)   Date First Assessed/Time First Assessed: 08/29/24 1132    Wound Number (Wound Clinic Only): 2  Location: Toe (Comment which one)  Wound Location Orientation: Left;Dorsal      Assessments 8/29/2024 11:16 AM 9/11/2024  9:33 AM   Wound Image       Site Assessment Yellow;Moist;Pink Clean;Dry;Intact   Closure Not approximated Not approximated   Drainage Amount Small Scant   Drainage Description Yellow Clear   Treatments Cleansed;Saline;Wound  Cleansed;Saline;Wound    Dressing Xeroform;Bandaid Acticoat;Bandaid   Dressing Changed New Changed   Dressing Status Clean;Dry;Intact Clean;Dry;Intact   Wound Length (cm) 0.1 cm 0.1 cm   Wound Width (cm) 0.1 cm 0.1 cm   Wound Surface Area (cm^2) 0.01 cm^2 0.01 cm^2   Wound Depth (cm) 0.1 cm 0.1 cm   Wound Volume (cm^3) 0.001 cm^3 0.001 cm^3   Wound Healing % -- 0   Margins Attached edges Flat and Intact   Angelica-wound Assessment Clean;Dry;Maceration Clean;Dry;Intact   Wound Granulation Tissue Pink --   Wound Bed Granulation (%) 80 % 0 %   Wound Bed Epithelium (%) 0 % 98 %   Wound Bed Slough (%) 20 % 0 %   Wound Bed Eschar (%) 0 % 0 %   Wound Bed  Fibrin (%) 0 % 0 %   State of Healing Non-healing Epithelialized   Wound Odor None None       Active Orders   Date Order Priority Status Authorizing Provider   09/11/24 1201 OP Wound Dressing Routine Active Melany Hathaway APRN     - Cleansing:    Cleanse with normal saline or wound cleanser     - Cleansing:    Cleanse with Vashe     - Dressing:    Acticoat     - Additional Wound Dressing Information:    Bandaid     - Frequency:    Change dressing weekly   08/29/24 1421 OP Wound Dressing Routine Active Melany Hathaway APRN     - Cleansing:    Cleanse with normal saline or wound cleanser     - Dressing:    xeroform gauze     - Additional Wound Dressing Information:    bandaid     - Frequency:    Change dressing two times per week       Compression Wrap 08/29/24 Leg Left;Lower (Active)   Placement Date: 08/29/24   Location: Leg  Wound Location Orientation: Left;Lower      Assessments 8/29/2024 11:16 AM 9/11/2024  9:33 AM   Response to Treatment Well tolerated Well tolerated   Compression Layers Single Single   Compression Product Type Tubigrip/Spanda Tubigrip/Spanda   Dressing Applied Yes Yes   Compression Wrap Location Toes to Knee Toes to Knee   Compression Wrap Status Clean;Dry;Intact Clean;Dry;Intact       No associated orders.           Procedures  ASSESSMENT AND PLAN:      1. Non-pressure chronic ulcer of left lower leg with fat layer exposed (HCC)  - Debridement Left;Lateral;Proximal Ankle  - Debridement Left;Lateral;Distal Ankle  - OP Wound Dressing; Standing  - OP Wound Dressing; Standing    2. Type 2 diabetes mellitus with other circulatory complication, with long-term current use of insulin (HCC)  - OP Wound Dressing; Standing  - OP Wound Dressing; Standing    3. Diabetic ulcer of left great toe (HCC)    Chronic full thickness wounds on lateral left lower leg, present for >6 months. Medi-honey dressing for the past week, tolerated well. Wounds remain dry with adherent slough and small amount granulation  present in wound bed. Measurement of distal wound slightly improved, proximal wound measurements unchanged. Periwound with blanchable erythema--appears increased since last week, no increase in pain, drainage, warmth, edema. Wounds were conservatively debrided (see procedure notes). Following debridement wounds were cleansed with Vashe. There are no s/sx of skin infection--pt was advised to monitor for these and to go to ED if he develops any sx. Betamethasone valerate was applied to skin on ankle to treat suspected dermatitis.   There is a wound on the right great toe that was previously closed, daughter reports intermittent clear drainage from the site. There is a small partial thickness opening at site.   Wound Care:  -Cleanse wound with wound cleanser  -Zinc paste to periwound  -Acticoat dressing to all wound sites  -Medigrip E for edema management  -Change dressing in 1 week and PRN    Pt is scheduled for [facility] this afternoon. Will initiate compression wrap next week for edema management if appropriate based on results.    Continue to keep dressing dry. Wear cast cover if showering.     Continue nutrition for wound healing, encourages reduced carbohydrate intake, increased protein intake, and healthy diet. Recommended increase vitamin intake (either with foods or vitamin supplements), need vitamin A, Bs, C, D and zinc for wound healing.   Continue to work on improving blood sugar control  Pt daughter requested DME order for motorized scooter due to limited ability to walk more than a short distance without becoming tired and with slight SOB and poor balance. This limitation is not related to the wound. Recommended they reach out to his PCP's office to discuss the DME order. Will also place call to Dr. Greenberg's office at [facility] [phone].  Risks, benefits, and alternatives of current treatment plan discussed in detail.  Questions and concerns addressed. Red flags to RTC or ED reviewed.  Patient agrees  to plan.      Return in about 1 week (around 9/18/2024) for APN visit x 30 mins.    I spent 30 minutes with the patient. This time included:  preparing to see the patient (eg, review notes and recent diagnostics), seeing the patient, performing a medically appropriate examination and/or evaluation, counseling and educating the patient, and documenting in the record.    NOTE TO PATIENT: The 21st Century Cures Act makes clinical notes like these available to patients in the interest of transparency. Clinical notes are medical documents used by physicians and care providers to communicate with each other. These documents include medical language and terminology, abbreviations, and treatment information that may sound technical and at times possibly unfamiliar. In addition, at times, the verbiage may appear blunt or direct. These documents are one tool providers use to communicate relevant information and clinical opinions of the care providers in a way that allows common understanding of the clinical context.

## 2024-09-11 ENCOUNTER — OFFICE VISIT (OUTPATIENT)
Dept: WOUND CARE | Facility: HOSPITAL | Age: 74
End: 2024-09-11
Attending: NURSE PRACTITIONER
Payer: MEDICAID

## 2024-09-11 ENCOUNTER — HOSPITAL ENCOUNTER (OUTPATIENT)
Dept: ULTRASOUND IMAGING | Facility: HOSPITAL | Age: 74
Discharge: HOME | End: 2024-09-11
Attending: NURSE PRACTITIONER
Payer: MEDICARE

## 2024-09-11 VITALS
RESPIRATION RATE: 18 BRPM | DIASTOLIC BLOOD PRESSURE: 85 MMHG | TEMPERATURE: 98 F | HEART RATE: 87 BPM | SYSTOLIC BLOOD PRESSURE: 154 MMHG | OXYGEN SATURATION: 94 %

## 2024-09-11 DIAGNOSIS — L97.529 DIABETIC ULCER OF LEFT GREAT TOE (HCC): ICD-10-CM

## 2024-09-11 DIAGNOSIS — E11.59 TYPE 2 DIABETES MELLITUS WITH OTHER CIRCULATORY COMPLICATIONS (HCC): ICD-10-CM

## 2024-09-11 DIAGNOSIS — Z79.4 TYPE 2 DIABETES MELLITUS WITH OTHER CIRCULATORY COMPLICATION, WITH LONG-TERM CURRENT USE OF INSULIN (HCC): ICD-10-CM

## 2024-09-11 DIAGNOSIS — L97.922 NON-PRESSURE CHRONIC ULCER OF LEFT LOWER LEG WITH FAT LAYER EXPOSED (HCC): Primary | ICD-10-CM

## 2024-09-11 DIAGNOSIS — E11.59 TYPE 2 DIABETES MELLITUS WITH OTHER CIRCULATORY COMPLICATION, WITH LONG-TERM CURRENT USE OF INSULIN (HCC): ICD-10-CM

## 2024-09-11 DIAGNOSIS — E11.621 DIABETIC ULCER OF LEFT GREAT TOE (HCC): ICD-10-CM

## 2024-09-11 DIAGNOSIS — L97.922 NON-PRESSURE CHRONIC ULCER OF LEFT LOWER LEG WITH FAT LAYER EXPOSED (HCC): ICD-10-CM

## 2024-09-11 PROCEDURE — 93922 UPR/L XTREMITY ART 2 LEVELS: CPT | Performed by: NURSE PRACTITIONER

## 2024-09-11 PROCEDURE — 97597 DBRDMT OPN WND 1ST 20 CM/<: CPT | Performed by: NURSE PRACTITIONER

## 2024-09-11 NOTE — PROGRESS NOTES
Patient ID: Guillermo White is a 74 year old male.    Debridement Left;Lateral;Proximal Ankle   Wound 08/29/24 3 Ankle Left;Lateral;Proximal    Performed by: Melany Hathaway APRN  Authorized by: Melany Hathaway APRN      Consent   Consent obtained? verbal  Consent given by: patient  Risks discussed? procedural risks discussed  Time out called at 9/11/2024 9:55 AM  Immediately prior to the procedure a time out was called and the performing provider verified the correct patient, procedure, equipment, support staff, and site/side marked as required.    Debridement Details  Performed by: NP  Debridement type: conservative sharp  Pain control: benzocaine 20%  Pain control administration type: topical    Pre-debridement measurements  Length (cm): 2.1  Width (cm): 1.5  Depth (cm): 0.1  Surface Area (cm^2): 3.15    Post-debridement measurements  Length (cm): 2.1  Width (cm): 1.5  Depth (cm): 0.1  Percent debrided: 100%  Surface Area (cm^2): 3.15  Area Debrided (cm^2): 3.15  Volume (cm^3): 0.32    Devitalized tissue debrided: biofilm, fibrin and slough  Instrument(s) utilized: curette  Bleeding: none  Hemostasis obtained with: not applicable  Procedural pain (0-10): 2  Post-procedural pain: 2   Response to treatment: procedure was tolerated well    Debridement Left;Lateral;Distal Ankle   Wound 08/29/24 1 Ankle Left;Lateral;Distal    Performed by: Melany Hathaway APRN  Authorized by: Melany Hathaway APRN      Consent   Consent obtained? verbal  Consent given by: patient  Risks discussed? procedural risks discussed  Time out called at 9/11/2024 9:55 AM  Immediately prior to the procedure a time out was called and the performing provider verified the correct patient, procedure, equipment, support staff, and site/side marked as required.    Debridement Details  Performed by: NP  Debridement type: conservative sharp  Pain control: benzocaine 20%  Pain control administration type: topical    Pre-debridement  measurements  Length (cm): 1.3  Width (cm): 1.2  Depth (cm): 0.2  Surface Area (cm^2): 1.56    Post-debridement measurements  Length (cm): 1.3  Width (cm): 1.2  Depth (cm): 0.2  Percent debrided: 100%  Surface Area (cm^2): 1.56  Area Debrided (cm^2): 1.56  Volume (cm^3): 0.31    Devitalized tissue debrided: biofilm, fibrin and slough  Instrument(s) utilized: curette  Bleeding: none  Procedural pain (0-10): 2  Post-procedural pain: 2   Response to treatment: procedure was tolerated well

## 2024-09-19 ENCOUNTER — OFFICE VISIT (OUTPATIENT)
Dept: WOUND CARE | Facility: HOSPITAL | Age: 74
End: 2024-09-19
Attending: NURSE PRACTITIONER
Payer: MEDICAID

## 2024-09-19 VITALS
TEMPERATURE: 97 F | HEART RATE: 88 BPM | SYSTOLIC BLOOD PRESSURE: 112 MMHG | DIASTOLIC BLOOD PRESSURE: 64 MMHG | RESPIRATION RATE: 18 BRPM | OXYGEN SATURATION: 95 %

## 2024-09-19 DIAGNOSIS — L97.922 NON-PRESSURE CHRONIC ULCER OF LEFT LOWER LEG WITH FAT LAYER EXPOSED (HCC): Primary | ICD-10-CM

## 2024-09-19 DIAGNOSIS — R68.89 ABNORMAL ANKLE BRACHIAL INDEX (ABI): ICD-10-CM

## 2024-09-19 DIAGNOSIS — E11.65 UNCONTROLLED TYPE 2 DIABETES MELLITUS WITH HYPERGLYCEMIA, WITH LONG-TERM CURRENT USE OF INSULIN (HCC): ICD-10-CM

## 2024-09-19 DIAGNOSIS — Z79.4 UNCONTROLLED TYPE 2 DIABETES MELLITUS WITH HYPERGLYCEMIA, WITH LONG-TERM CURRENT USE OF INSULIN (HCC): ICD-10-CM

## 2024-09-19 DIAGNOSIS — L97.911 NON-PRESSURE CHRONIC ULCER RIGHT LOWER LEG, LIMITED TO BREAKDOWN SKIN (HCC): ICD-10-CM

## 2024-09-19 PROCEDURE — 97597 DBRDMT OPN WND 1ST 20 CM/<: CPT | Performed by: NURSE PRACTITIONER

## 2024-09-19 NOTE — PROGRESS NOTES
Chief Complaint   Patient presents with    Wound Recheck     Left lateral ankle. Pt states Bs are at 110     HPI:   9/19/24: F/u left lateral ankle wounds. New wound on right medial ankle. Here with his daughters. Language line used for [place] translation. Pt reports he is doing well. Pain at wound sites is less than at previous visits.     9/11/24: F/u L lateral ankle wounds. Language line translating for the visit. He has been applying medi-honey and gauze to the wounds for the past week. Tolerating well. Daughter reports old wound site on left great toe has intermittent clear drainage. Pt reports pain is well controlled. C/o intermittent itching around left ankle. Daughter is asking to have motorized scooter ordered for patient out of concern for tiredness after walking short distances, imbalance and mild SOB. No pain at wound site when walking    9/5/24: F/u visit on left lateral ankle wounds. Language line translated for visit. Pt tolerated dressing well, but felt dressing was tight. He says his pain varies to little pain up to 7/10. No change in health or new concerns.    8/29/24: 74 yr old male here today with his daughter for evaluation of left lateral ankle wounds. They are [place] speaking, language line was used for translation. Reports wounds have been present for at least 6 months. He went to [place] ED 8/6/24 for evaluation of the wounds. He was treated for wound infection with a course of Augmentin 875mg. Pt says the redness around the wound has improved since then. He was previously receiving treatment for the wound in [place]. He has tried medi-honey and what appears to be the equivalent to Santyl. Reports both treatments caused increased pain at the wound site. Since he was at the ED, his daughter has been cleaning with an OTC wound cleanser and wounds have been left open to air. He has been s howering regularly. Additionally, he has wound on his left great toe that they think has healed,  occasionally she notices a drop of drainage. Pt reports he currently has no pain at the wound site, but will experience sharp pain at the wound site at night time. He was given Gabapentin by [facility], but this has not helped. He denies known history of stroke, blood clots, CHF, PAD, Afib. Reports he takes Plavix for poor circulation. Reports taking Insulin as prescribed.      Medical Problems:  T2DM (uncontrolled)  HTN  Hyperlipidemia    Pertinent Labs:     Ref Range & Units 8/5/24 2200   WBC  3.5 - 10.5 K/UL 5.1   RBC  4.20 - 5.80 M/UL 4.65   HGB  13.0 - 17.5 GM/DL 13.5   HCT  38.0 - 54.0 % 40.9     BUN  7 - 22 MG/DL 25 High    CREATININE  0.6 - 1.4 MG/DL 1.07   GLUCOSE  70 - 100 MG/ High      ESTIMATED GFR  >59 ML/MIN/1.73M2 73     SED RATE  0 - 25 MM 19     C REACTIVE PROTEIN  <8.1 MG/L 2.0     HARJINDER Result 9/11/24:   PROCEDURE: US ANKLE BRACHIAL INDEX (CPT=93922)    COMPARISON: None.   INDICATIONS: E11.59 Type 2 diabetes mellitus with other circulatory complications (HCC) L97.922 Non-pressure chronic ulcer of left lower leg with fat layer exposed ([facility])      TECHNIQUE: Segmental pressures and Doppler wave forms were obtained in the ankles and feet.       SEGMENTAL BP   PRESSURE INDEX      RIGHT   BRACHIAL: 150 mmHg   POSTERIOR TIBIAL: 87 mmHg 0.6   DORSALIS PEDIS: 100 mmHg 0.7       LEFT   BRACHIAL: 143 mmHg   POSTERIOR TIBIAL: 80 mmHg 0.5   DORSALIS PEDIS: 76 mmHg 0.5        Impression   CONCLUSION:  1. Moderately moderate to severe decrease in resting ankle-brachial indices, with an HARJINDER of 0.5 on the symptomatic left side.       Outside Imaging:  EXAM: AP and lateral views of the left tibia and fibula   DATE: 8/5/2024 11:49 PM   CLINICAL INDICATION: ulceration two months, treated in [place], seems worse to   family.   COMPARISON: None.   FINDINGS:   Bones:There is no acute fracture or dislocation.   Joints: Joint spaces are intact without degenerative proliferation.   Soft Tissues: Soft tissue ulceration over  the lateral aspect of the distal lower   extremity.   IMPRESSION:   No acute fracture or dislocation of the left tibia or fibula.  No osseous   erosion.  Soft tissue ulceration distally.     EXAM: Non-Standing AP, lateral and oblique views of the left foot   DATE: 8/5/2024 11:49 PM   CLINICAL INDICATION: ulcerations left foot at base of great toe and left lower   leg distally/laterally.    COMPARISON: None.   FINDINGS:   Bones: There is no acute fracture or dislocation.    Joints: Joint spaces show mild degenerative proliferation.   Soft Tissues: Focal soft tissue swelling at the medial aspect of the hallux   distal phalanx.     IMPRESSION:   No acute fracture-dislocation left foot.  No osseous erosion.  Soft tissue   swelling.       Current Outpatient Medications:     amLODIPine 5 MG Oral Tab, Take 1 tablet (5 mg total) by mouth daily., Disp: , Rfl:     clopidogrel 75 MG Oral Tab, Take 1 tablet (75 mg total) by mouth daily., Disp: , Rfl:     ergocalciferol 1.25 MG (60676 UT) Oral Cap, Take 1 capsule (50,000 Units total) by mouth once a week., Disp: , Rfl:     gabapentin 100 MG Oral Cap, Take 1 capsule (100 mg total) by mouth 3 (three) times daily., Disp: , Rfl:     LANTUS SOLOSTAR 100 UNIT/ML Subcutaneous Solution Pen-injector, Inject 66 Units into the skin daily., Disp: , Rfl:     Insulin Lispro, 1 Unit Dial, 100 UNIT/ML Subcutaneous Solution Pen-injector, Inject 8 Units as directed 3 (three) times daily., Disp: , Rfl:     BD PEN NEEDLE SHANNON 2ND GEN 32G X 4 MM Does not apply Misc, 1 each 4 (four) times daily., Disp: , Rfl:     losartan-hydroCHLOROthiazide 100-25 MG Oral Tab, Take 1 tablet by mouth daily., Disp: , Rfl:     rosuvastatin 40 MG Oral Tab, Take 1 tablet (40 mg total) by mouth nightly., Disp: , Rfl:     Allergies:  No Known Allergies     REVIEW OF SYSTEMS:   Review of Systems   Constitutional:  Negative for activity change, appetite change, chills, fatigue and fever.   Respiratory:  Negative for  shortness of breath.    Cardiovascular:  Negative for chest pain.   Gastrointestinal:  Negative for abdominal pain.   Musculoskeletal:  Positive for gait problem (ambulates with cane).   Skin:  Positive for wound. Negative for rash.   Neurological:  Negative for dizziness, numbness and headaches.   Psychiatric/Behavioral:          Daughter reports he sometimes gets depressed by non-healing wound     PHYSICAL EXAM:   /64 (BP Location: Right arm, Patient Position: Sitting, Cuff Size: adult)   Pulse 88   Temp 97.3 °F (36.3 °C) (Oral)   Resp 18   SpO2 95%    Estimated body mass index is 26.63 kg/m² as calculated from the following:    Height as of 24: 67\".    Weight as of 24: 170 lb.   Vital signs reviewed.Appears stated age, well groomed.    Physical Exam  Constitutional:       General: He is not in acute distress.     Appearance: Normal appearance. He is not ill-appearing.   HENT:      Head: Normocephalic and atraumatic.   Cardiovascular:      Rate and Rhythm: Normal rate.      Pulses:           Dorsalis pedis pulses are 2+ on the right side and 2+ on the left side.        Posterior tibial pulses are 2+ on the right side and 2+ on the left side.      Comments: 24 handheld doppler findings of biphasic wave sounds at right DP and PT pulses, monophasic wave sounds left DP and PT.    Pulmonary:      Effort: Pulmonary effort is normal.   Musculoskeletal:      Right lower leg: Edema present.      Left lower le+ Pitting Edema present.   Skin:     General: Skin is warm and dry.      Capillary Refill: Capillary refill takes 2 to 3 seconds.      Findings: Wound (left lateral ankle, left 1st toe; see wound care flowsheet) present. No rash.   Neurological:      General: No focal deficit present.      Mental Status: He is alert and oriented to person, place, and time.   Psychiatric:         Mood and Affect: Mood normal.         Behavior: Behavior normal.       Wound 24 3 Ankle  Left;Lateral;Proximal (Active)   Date First Assessed/Time First Assessed: 08/29/24 1106    Wound Number (Wound Clinic Only): 3  Location: Ankle  Wound Location Orientation: Left;Lateral;Proximal      Assessments 8/29/2024 11:16 AM 9/19/2024  9:28 AM   Wound Image        Site Assessment Moist;Yellow Dry;Yellow;Pink   Closure Not approximated Not approximated   Drainage Amount Scant Scant   Drainage Description Yellow Yellow   Treatments Cleansed;Wound ;Saline Cleansed;Saline;Vashe;Honey Gel   Dressing Joanne;Tape 4x4s;Gauze;Kerlix roll;Tape   Dressing Changed New Changed   Dressing Status Dry;Intact;Clean Dry;Intact;Clean   Wound Length (cm) 1.9 cm 2 cm   Wound Width (cm) 1.6 cm 1.5 cm   Wound Surface Area (cm^2) 3.04 cm^2 3 cm^2   Wound Depth (cm) 0.1 cm 0.2 cm   Wound Volume (cm^3) 0.304 cm^3 0.6 cm^3   Wound Healing % -- -97   Margins Attached edges Attached edges;Well-defined edges   Non-staged Wound Description Full thickness Full thickness   Angelica-wound Assessment Clean;Dry;Blanchable erythema;Painful Clean;Dry;Blanchable erythema   Wound Granulation Tissue -- Pink   Wound Bed Granulation (%) 10 % 5 %   Wound Bed Epithelium (%) 0 % 0 %   Wound Bed Slough (%) 90 % 95 %   Wound Bed Eschar (%) 0 % 0 %   Wound Bed Fibrin (%) 0 % 0 %   State of Healing Non-healing Non-healing   Wound Odor None None       Active Orders   Date Order Priority Status Authorizing Provider   09/11/24 1201 OP Wound Dressing Routine Active Melany Hathaway APRN     - Cleansing:    Cleanse with normal saline or wound cleanser     - Cleansing:    Cleanse with Vashe     - Dressing:    Acticoat     - Dressing:    Dry gauze     - Dressing:    Conforming roll     - Dressing:    Paper tape     - Additional Wound Dressing Information:    betamethasone to periwound     - Frequency:    Change dressing weekly   09/05/24 1659 OP Wound Dressing Routine Active Melany Hathaway APRN     - Cleansing:    Cleanse with normal saline or wound cleanser      - Cleansing:    Cleanse with Vashe     - Dressing:    Honey gel     - Dressing:    Dry gauze     - Dressing:    Conforming roll     - Dressing:    Paper tape     - Frequency:    Change dressing every three days   08/29/24 1421 OP Wound Dressing Routine Active Melany Hathaway APRN     - Cleansing:    Cleanse with normal saline or wound cleanser     - Dressing:    Enluxtra humidifiber     - Dressing:    Conforming roll     - Frequency:    Change dressing two times per week       Inactive Orders   Date Order Priority Status Authorizing Provider   09/19/24 1021 Debridement Left;Lateral;Proximal Ankle Routine Completed Melany Hathaway APRN   09/11/24 0955 Debridement Left;Lateral;Proximal Ankle Routine Completed Melany Hathaway APRN   09/05/24 1401 Debridement Left;Lateral;Proximal Ankle Routine Completed Melany Hathaway APRN   08/29/24 1415 Debridement Left;Lateral;Proximal Ankle Routine Completed Melany Hathaway APRN       Wound 08/29/24 1 Ankle Left;Lateral;Distal (Active)   Date First Assessed/Time First Assessed: 08/29/24 1115    Wound Number (Wound Clinic Only): 1  Location: (c) Ankle  Wound Location Orientation: Left;Lateral;Distal      Assessments 8/29/2024 11:16 AM 9/19/2024  9:28 AM   Wound Image        Site Assessment Moist;Yellow;Pink;Red Dry;Pink;Yellow;Red   Closure Not approximated Not approximated   Drainage Amount Small Scant   Drainage Description Yellow Yellow   Treatments Cleansed;Saline;Wound  Cleansed;Saline;Vashe;Honey Gel   Dressing Joanne;Tape 4x4s;Gauze;Kerlix roll;Tape   Dressing Changed New Changed   Dressing Status Clean;Dry;Intact Clean;Dry;Intact   Wound Length (cm) 1.4 cm 1.5 cm   Wound Width (cm) 1.3 cm 1.3 cm   Wound Surface Area (cm^2) 1.82 cm^2 1.95 cm^2   Wound Depth (cm) 0.2 cm 0.2 cm   Wound Volume (cm^3) 0.364 cm^3 0.39 cm^3   Wound Healing % -- -7   Margins Well-defined edges Well-defined edges;Attached edges   Non-staged Wound Description Full thickness Full  thickness   Angelica-wound Assessment Dry;Intact;Blanchable erythema;Painful Dry;Intact;Blanchable erythema   Wound Granulation Tissue Pink;Red Red;Pink   Wound Bed Granulation (%) 100 % 80 %   Wound Bed Epithelium (%) 0 % 0 %   Wound Bed Slough (%) 20 % 20 %   Wound Bed Eschar (%) 0 % 0 %   Wound Bed Fibrin (%) 0 % 0 %   State of Healing Non-healing Non-healing   Wound Odor None None       Active Orders   Date Order Priority Status Authorizing Provider   09/11/24 1201 OP Wound Dressing Routine Active Melany Hathaway APRN     - Cleansing:    Cleanse with normal saline or wound cleanser     - Cleansing:    Cleanse with Vashe     - Dressing:    Acticoat     - Dressing:    Dry gauze     - Dressing:    Conforming roll     - Dressing:    Paper tape     - Additional Wound Dressing Information:    betamethasone to periwound     - Frequency:    Change dressing weekly   09/05/24 1659 OP Wound Dressing Routine Active Melany Hathaway APRN     - Cleansing:    Cleanse with normal saline or wound cleanser     - Cleansing:    Cleanse with Vashe     - Dressing:    Honey gel     - Dressing:    Dry gauze     - Dressing:    Conforming roll     - Dressing:    Paper tape     - Frequency:    Change dressing every three days   08/29/24 1421 OP Wound Dressing Routine Active Melany Hathaway APRN     - Cleansing:    Cleanse with normal saline or wound cleanser     - Dressing:    Enluxtra humidifiber     - Dressing:    Conforming roll     - Frequency:    Change dressing two times per week       Inactive Orders   Date Order Priority Status Authorizing Provider   09/11/24 0958 Debridement Left;Lateral;Distal Ankle Routine Completed Melany Hathaway APRN   09/05/24 1400 Debridement Left;Lateral;Distal Ankle Routine Completed Melany Hathaway APRN   08/29/24 1416 Debridement Left;Lateral;Distal Ankle Routine Completed Melany Hathaway APRN       Wound 08/29/24 2 Toe (Comment which one) Left;Dorsal (Active)   Date First Assessed/Time First  Assessed: 08/29/24 1132    Wound Number (Wound Clinic Only): 2  Location: Toe (Comment which one)  Wound Location Orientation: Left;Dorsal      Assessments 8/29/2024 11:16 AM 9/19/2024  9:28 AM   Wound Image       Site Assessment Yellow;Moist;Pink Clean;Dry;Intact;Epithelialization   Closure Not approximated Approximated   Drainage Amount Small None   Drainage Description Yellow --   Treatments Cleansed;Saline;Wound  --   Dressing Xeroform;Bandaid --   Dressing Changed New --   Dressing Status Clean;Dry;Intact --   Wound Length (cm) 0.1 cm 0 cm   Wound Width (cm) 0.1 cm 0 cm   Wound Surface Area (cm^2) 0.01 cm^2 0 cm^2   Wound Depth (cm) 0.1 cm 0 cm   Wound Volume (cm^3) 0.001 cm^3 0 cm^3   Wound Healing % -- 100   Margins Attached edges Flat and Intact   Angelica-wound Assessment Clean;Dry;Maceration Clean;Dry;Intact   Wound Granulation Tissue Pink --   Wound Bed Granulation (%) 80 % 0 %   Wound Bed Epithelium (%) 0 % 100 %   Wound Bed Slough (%) 20 % 0 %   Wound Bed Eschar (%) 0 % 0 %   Wound Bed Fibrin (%) 0 % 0 %   State of Healing Non-healing Epithelialized   Wound Odor None None       Active Orders   Date Order Priority Status Authorizing Provider   09/11/24 1201 OP Wound Dressing Routine Active Melany Hathaway APRN     - Cleansing:    Cleanse with normal saline or wound cleanser     - Cleansing:    Cleanse with Vashe     - Dressing:    Acticoat     - Additional Wound Dressing Information:    Bandaid     - Frequency:    Change dressing weekly   08/29/24 1421 OP Wound Dressing Routine Active Melany Hathaway APRN     - Cleansing:    Cleanse with normal saline or wound cleanser     - Dressing:    xeroform gauze     - Additional Wound Dressing Information:    bandaid     - Frequency:    Change dressing two times per week       Compression Wrap 08/29/24 Leg Left;Lower (Active)   Placement Date: 08/29/24   Location: Leg  Wound Location Orientation: Left;Lower      Assessments 8/29/2024 11:16 AM 9/19/2024   9:28 AM   Response to Treatment Well tolerated Well tolerated   Compression Layers Single Single   Compression Product Type Tubigrip/Spanda Tubigrip/Spanda   Dressing Applied Yes Yes   Compression Wrap Location Toes to Knee Toes to Knee   Compression Wrap Status Clean;Dry;Intact Clean;Dry;Intact       No associated orders.       Wound 09/19/24 Ankle Right;Medial (Active)   Date First Assessed/Time First Assessed: 09/19/24 0944   Location: Ankle  Wound Location Orientation: Right;Medial      Assessments 9/19/2024  9:28 AM   Wound Image     Site Assessment Dry;Yellow;Pink   Closure Not approximated   Drainage Amount None   Treatments Cleansed;Saline;Vashe;Honey Gel   Dressing Kerlix roll;Tape   Dressing Changed New   Dressing Status Clean;Dry;Intact   Wound Length (cm) 0.2 cm   Wound Width (cm) 0.2 cm   Wound Surface Area (cm^2) 0.04 cm^2   Wound Depth (cm) 0.1 cm   Wound Volume (cm^3) 0.004 cm^3   Margins Well-defined edges   Non-staged Wound Description Full thickness   Angelica-wound Assessment Blanchable erythema;Dry   Wound Granulation Tissue Pink   Wound Bed Granulation (%) 5 %   Wound Bed Epithelium (%) 0 %   Wound Bed Slough (%) 95 %   Wound Bed Eschar (%) 0 %   Wound Bed Fibrin (%) 0 %   State of Healing Non-healing   Wound Odor None       No associated orders.       Compression Wrap 09/19/24 Leg Right;Lower (Active)   Placement Date: 09/19/24   Location: Leg  Wound Location Orientation: Right;Lower      Assessments 9/19/2024  9:28 AM   Response to Treatment Well tolerated   Compression Layers Single   Compression Product Type Tubigrip/Spanda   Dressing Applied Yes   Compression Wrap Location Toes to Knee   Compression Wrap Status Clean;Intact;Dry       No associated orders.      Lower Extremity Measurements   Calf Ankle Foot Heel to Knee Knee to Thigh   Right                                Left Left Calf from:: Heel  Calf Left cm:: 36 Left Ankle from:: Heel  Left Ankle cm:: 22    Left Foot from:: Great toe  Left  Foot cm:: 24.4              Procedures  ASSESSMENT AND PLAN:      1. Non-pressure chronic ulcer of left lower leg with fat layer exposed (HCC)    2. 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 Abnormal ankle brachial index (HARJINDER)    4. Uncontrolled type 2 diabetes mellitus with hyperglycemia, with long-term current use of insulin (HCC)    Chronic full thickness wounds on lateral left lower leg, present for >6 months. New small wound on right medial ankle. Completed HARJINDER since last visit, 0.5 on left side, 0.7 on right side. Reviewed results with patient. Likely contributing to poor wound healing. Recommended eval by vascular surgery. Hindering factors for wound healing include uncontrolled T2DM, HTN.   Wound measurements haven't improved on the left.  Adherent slough in all wound beds. Periwound with blanchable erythema. No s/sx of infection. Left proximal wound was selectively debrided (see procedure notes), post-debridement wound photo not captured. Wounds were cleansed with Vashe prior to dressing changes.  Wound on right great toe healed.   Wound Care:  -Cleanse wound with wound cleanser  -Zinc paste to periwound  -Acticoat dressing to all wound sites  -Medigrip E for edema management  -Change dressing in 1 week and PRN    Continue to keep dressing dry. Wear cast cover if showering.     Continue nutrition for wound healing, encourages reduced carbohydrate intake, increased protein intake, and healthy diet. Recommended increase vitamin intake (either with foods or vitamin supplements), need vitamin A, Bs, C, D and zinc for wound healing.   Continue to work on improving blood sugar control  Risks, benefits, and alternatives of current treatment plan discussed in detail.  Questions and concerns addressed. Red flags to RTC or ED reviewed.  Patient agrees to plan.      Return in about 1 week (around 9/26/2024) for APN visit x 30 mins.    I spent 30 minutes with the patient. This  time included:  preparing to see the patient (eg, review notes and recent diagnostics), seeing the patient, performing a medically appropriate examination and/or evaluation, counseling and educating the patient, and documenting in the record.    NOTE TO PATIENT: The 21st Century Cures Act makes clinical notes like these available to patients in the interest of transparency. Clinical notes are medical documents used by physicians and care providers to communicate with each other. These documents include medical language and terminology, abbreviations, and treatment information that may sound technical and at times possibly unfamiliar. In addition, at times, the verbiage may appear blunt or direct. These documents are one tool providers use to communicate relevant information and clinical opinions of the care providers in a way that allows common understanding of the clinical context.

## 2024-09-19 NOTE — PROGRESS NOTES
Patient ID: Guillermo White is a 74 year old male.    Debridement Left;Lateral;Proximal Ankle   Wound 08/29/24 3 Ankle Left;Lateral;Proximal    Performed by: Melany Hathaway APRN  Authorized by: Melany Hathaway APRN      Consent   Consent obtained? verbal  Consent given by: patient  Risks discussed? procedural risks discussed  Time out called at 9/19/2024 10:10 AM  Immediately prior to the procedure a time out was called and the performing provider verified the correct patient, procedure, equipment, support staff, and site/side marked as required.    Debridement Details  Performed by: NP  Debridement type: conservative sharp  Pain control: benzocaine 20%  Pain control administration type: topical    Pre-debridement measurements  Length (cm): 2  Width (cm): 1.5  Depth (cm): 0.2  Surface Area (cm^2): 3    Post-debridement measurements  Length (cm): 2  Width (cm): 1.5  Depth (cm): 0.2  Percent debrided: 50%  Surface Area (cm^2): 3  Area Debrided (cm^2): 1.5  Volume (cm^3): 0.6    Devitalized tissue debrided: biofilm, fibrin and slough  Instrument(s) utilized: blade  Comment regarding bleeding: scant  Hemostasis obtained with: pressure  Procedural pain (0-10): 2  Post-procedural pain: 2   Response to treatment: procedure was tolerated well

## 2024-09-23 ENCOUNTER — TELEPHONE (OUTPATIENT)
Dept: WOUND CARE | Facility: HOSPITAL | Age: 74
End: 2024-09-23

## 2024-09-25 ENCOUNTER — APPOINTMENT (OUTPATIENT)
Dept: WOUND CARE | Facility: HOSPITAL | Age: 74
End: 2024-09-25
Attending: NURSE PRACTITIONER
Payer: MEDICAID

## 2024-09-25 VITALS
SYSTOLIC BLOOD PRESSURE: 127 MMHG | HEART RATE: 95 BPM | DIASTOLIC BLOOD PRESSURE: 79 MMHG | OXYGEN SATURATION: 97 % | TEMPERATURE: 98 F

## 2024-09-25 DIAGNOSIS — L97.911 NON-PRESSURE CHRONIC ULCER RIGHT LOWER LEG, LIMITED TO BREAKDOWN SKIN (HCC): ICD-10-CM

## 2024-09-25 DIAGNOSIS — R68.89 ABNORMAL ANKLE BRACHIAL INDEX (ABI): ICD-10-CM

## 2024-09-25 DIAGNOSIS — L03.116 CELLULITIS OF LEFT LOWER EXTREMITY: ICD-10-CM

## 2024-09-25 DIAGNOSIS — L97.922 NON-PRESSURE CHRONIC ULCER OF LEFT LOWER LEG WITH FAT LAYER EXPOSED (HCC): Primary | ICD-10-CM

## 2024-09-25 PROBLEM — I10 HYPERTENSION: Status: ACTIVE | Noted: 2024-09-25

## 2024-09-25 PROCEDURE — 99214 OFFICE O/P EST MOD 30 MIN: CPT | Performed by: NURSE PRACTITIONER

## 2024-09-25 RX ORDER — SULFAMETHOXAZOLE/TRIMETHOPRIM 800-160 MG
1 TABLET ORAL 2 TIMES DAILY
Qty: 10 TABLET | Refills: 0 | Status: SHIPPED | OUTPATIENT
Start: 2024-09-25 | End: 2024-09-30

## 2024-09-25 NOTE — PROGRESS NOTES
Chief Complaint   Patient presents with    Wound Recheck     HPI:   9/25/24: F/u left lateral ankle wounds and right ankle wound. Accompanied by his daughter, language line used for Mosotho translation. C/o pain at night time, no pain with walking. Itching around ankle as well. Taking Tylenol 550 mg 1 tablet without relief. Has been changing medi-honey dressing as recommended.    9/19/24: F/u left lateral ankle wounds. New wound on right medial ankle. Here with his daughters. Language line used for Mosotho translation. Pt reports he is doing well. Pain at wound sites is less than at previous visits.     9/11/24: F/u L lateral ankle wounds. Language line translating for the visit. He has been applying medi-honey and gauze to the wounds for the past week. Tolerating well. Daughter reports old wound site on left great toe has intermittent clear drainage. Pt reports pain is well controlled. C/o intermittent itching around left ankle. Daughter is asking to have motorized scooter ordered for patient out of concern for tiredness after walking short distances, imbalance and mild SOB. No pain at wound site when walking    9/5/24: F/u visit on left lateral ankle wounds. Language line translated for visit. Pt tolerated dressing well, but felt dressing was tight. He says his pain varies to little pain up to 7/10. No change in health or new concerns.    8/29/24: 74 yr old male here today with his daughter for evaluation of left lateral ankle wounds. They are Mosotho speaking, language line was used for translation. Reports wounds have been present for at least 6 months. He went to [facility] ED 8/6/24 for evaluation of the wounds. He was treated for wound infection with a course of Augmentin 875mg. Pt says the redness around the wound has improved since then. He was previously receiving treatment for the wound in [place]. He has tried medi-honey and what appears to be the equivalent to Santyl. Reports both treatments caused increased  pain at the wound site. Since he was at the ED, his daughter has been cleaning with an OT wound cleanser and wounds have been left open to air. He has been s howering regularly. Additionally, he has wound on his left great toe that they think has healed, occasionally she notices a drop of drainage. Pt reports he currently has no pain at the wound site, but will experience sharp pain at the wound site at night time. He was given Gabapentin by [facility], but this has not helped. He denies known history of blood clots, CHF, PAD, Afib. Reports he takes Plavix for poor circulation. Reports taking Insulin as prescribed.      Medical Problems:  T2DM (uncontrolled)  HTN  Hyperlipidemia    Past Medical History:    Aspiration pneumonia ([facility])    3/2023    Diabetes ([facility])    High blood pressure    High cholesterol    Lacunar infarction ([facility])    Osteomyelitis of lumbar spine ([facility])    2010    TIA (transient ischemic attack)    Vestibular schwannoma ([facility])     Pertinent Labs:     Ref Range & Units 8/5/24 2200   WBC  3.5 - 10.5 K/UL 5.1   RBC  4.20 - 5.80 M/UL 4.65   HGB  13.0 - 17.5 GM/DL 13.5   HCT  38.0 - 54.0 % 40.9     BUN  7 - 22 MG/DL 25 High    CREATININE  0.6 - 1.4 MG/DL 1.07   GLUCOSE  70 - 100 MG/ High      ESTIMATED GFR  >59 ML/MIN/1.73M2 73     SED RATE  0 - 25 MM 19     C REACTIVE PROTEIN  <8.1 MG/L 2.0     HARJINDER Result 9/11/24:   PROCEDURE: US ANKLE BRACHIAL INDEX (CPT=93922)    COMPARISON: None.   INDICATIONS: E11.59 Type 2 diabetes mellitus with other circulatory complications ([facility]) L97.922 Non-pressure chronic ulcer of left lower leg with fat layer exposed ([facility])      TECHNIQUE: Segmental pressures and Doppler wave forms were obtained in the ankles and feet.       SEGMENTAL BP   PRESSURE INDEX      RIGHT   BRACHIAL: 150 mmHg   POSTERIOR TIBIAL: 87 mmHg 0.6   DORSALIS PEDIS: 100 mmHg 0.7       LEFT   BRACHIAL: 143 mmHg   POSTERIOR TIBIAL: 80 mmHg 0.5   DORSALIS PEDIS: 76 mmHg 0.5        Impression   CONCLUSION:  1.  Moderately moderate to severe decrease in resting ankle-brachial indices, with an HARJINDER of 0.5 on the symptomatic left side.       Outside Imaging:  EXAM: AP and lateral views of the left tibia and fibula   DATE: 8/5/2024 11:49 PM   CLINICAL INDICATION: ulceration two months, treated in [place], seems worse to   family.   COMPARISON: None.   FINDINGS:   Bones:There is no acute fracture or dislocation.   Joints: Joint spaces are intact without degenerative proliferation.   Soft Tissues: Soft tissue ulceration over the lateral aspect of the distal lower   extremity.   IMPRESSION:   No acute fracture or dislocation of the left tibia or fibula.  No osseous   erosion.  Soft tissue ulceration distally.     EXAM: Non-Standing AP, lateral and oblique views of the left foot   DATE: 8/5/2024 11:49 PM   CLINICAL INDICATION: ulcerations left foot at base of great toe and left lower   leg distally/laterally.    COMPARISON: None.   FINDINGS:   Bones: There is no acute fracture or dislocation.    Joints: Joint spaces show mild degenerative proliferation.   Soft Tissues: Focal soft tissue swelling at the medial aspect of the hallux   distal phalanx.     IMPRESSION:   No acute fracture-dislocation left foot.  No osseous erosion.  Soft tissue   swelling.       Current Outpatient Medications:     sulfamethoxazole-trimethoprim -160 MG Oral Tab per tablet, Take 1 tablet by mouth 2 (two) times daily for 5 days., Disp: 10 tablet, Rfl: 0    amLODIPine 5 MG Oral Tab, Take 1 tablet (5 mg total) by mouth daily., Disp: , Rfl:     clopidogrel 75 MG Oral Tab, Take 1 tablet (75 mg total) by mouth daily., Disp: , Rfl:     ergocalciferol 1.25 MG (29589 UT) Oral Cap, Take 1 capsule (50,000 Units total) by mouth once a week., Disp: , Rfl:     gabapentin 100 MG Oral Cap, Take 1 capsule (100 mg total) by mouth 3 (three) times daily., Disp: , Rfl:     LANTUS SOLOSTAR 100 UNIT/ML Subcutaneous Solution Pen-injector, Inject 66 Units into the skin  daily., Disp: , Rfl:     Insulin Lispro, 1 Unit Dial, 100 UNIT/ML Subcutaneous Solution Pen-injector, Inject 8 Units as directed 3 (three) times daily., Disp: , Rfl:     BD PEN NEEDLE SHANNON 2ND GEN 32G X 4 MM Does not apply Misc, 1 each 4 (four) times daily., Disp: , Rfl:     losartan-hydroCHLOROthiazide 100-25 MG Oral Tab, Take 1 tablet by mouth daily., Disp: , Rfl:     rosuvastatin 40 MG Oral Tab, Take 1 tablet (40 mg total) by mouth nightly., Disp: , Rfl:     Allergies:  No Known Allergies     REVIEW OF SYSTEMS:   Review of Systems   Constitutional:  Negative for activity change, appetite change, chills, fatigue and fever.   Respiratory:  Negative for shortness of breath.    Cardiovascular:  Negative for chest pain.   Gastrointestinal:  Negative for abdominal pain.   Musculoskeletal:  Positive for gait problem (ambulates with cane).   Skin:  Positive for wound. Negative for rash.   Neurological:  Negative for dizziness, numbness and headaches.   Psychiatric/Behavioral:          Daughter reports he sometimes gets depressed by non-healing wound     PHYSICAL EXAM:   /79   Pulse 95   Temp 98.4 °F (36.9 °C)   SpO2 97%    Estimated body mass index is 26.63 kg/m² as calculated from the following:    Height as of 8/29/24: 67\".    Weight as of 8/29/24: 170 lb.   Vital signs reviewed.Appears stated age, well groomed.    Physical Exam  Constitutional:       General: He is not in acute distress.     Appearance: Normal appearance. He is not ill-appearing.   HENT:      Head: Normocephalic and atraumatic.   Cardiovascular:      Rate and Rhythm: Normal rate.      Pulses:           Dorsalis pedis pulses are 2+ on the right side and 2+ on the left side.        Posterior tibial pulses are 2+ on the right side and 2+ on the left side.      Comments: 8/29/24 handheld doppler findings of biphasic wave sounds at right DP and PT pulses, monophasic wave sounds left DP and PT.    Pulmonary:      Effort: Pulmonary effort is normal.    Musculoskeletal:      Right lower leg: Edema present.      Left lower le+ Pitting Edema present.   Skin:     General: Skin is warm and dry.      Capillary Refill: Capillary refill takes 2 to 3 seconds.      Findings: Wound (left lateral ankle, left 1st toe R ankle; see wound care flowsheet) present. No rash.   Neurological:      General: No focal deficit present.      Mental Status: He is alert and oriented to person, place, and time.   Psychiatric:         Mood and Affect: Mood normal.         Behavior: Behavior normal.       Wound 24 3 Ankle Left;Lateral;Proximal (Active)   Date First Assessed/Time First Assessed: 24 1106    Wound Number (Wound Clinic Only): 3  Location: Ankle  Wound Location Orientation: Left;Lateral;Proximal      Assessments 2024 11:16 AM 2024  9:30 AM   Wound Image        Site Assessment Moist;Yellow Moist;Yellow;Pink   Closure Not approximated Not approximated   Drainage Amount Scant Large   Drainage Description Yellow Serosanguineous   Treatments Cleansed;Wound ;Saline Cleansed;Saline;Vashe;Honey Gel   Dressing Joanne;Tape 4x4s;Gauze;Tape;Joanne   Dressing Changed New Changed   Dressing Status Dry;Intact;Clean --   Wound Length (cm) 1.9 cm 2.1 cm   Wound Width (cm) 1.6 cm 1.8 cm   Wound Surface Area (cm^2) 3.04 cm^2 3.78 cm^2   Wound Depth (cm) 0.1 cm 0.2 cm   Wound Volume (cm^3) 0.304 cm^3 0.756 cm^3   Wound Healing % -- -149   Margins Attached edges Well-defined edges   Non-staged Wound Description Full thickness Full thickness   Angelica-wound Assessment Clean;Dry;Blanchable erythema;Painful Clean;Dry;Blanchable erythema;Edema   Wound Granulation Tissue -- Pink   Wound Bed Granulation (%) 10 % 25 %   Wound Bed Epithelium (%) 0 % 0 %   Wound Bed Slough (%) 90 % 75 %   Wound Bed Eschar (%) 0 % 0 %   Wound Bed Fibrin (%) 0 % 0 %   State of Healing Non-healing Early/partial granulation   Wound Odor None None       Active Orders   Date Order Priority Status  Authorizing Provider   09/25/24 1429 OP Wound Dressing Routine Active Melany Hathaway APRN     - Cleansing:    Cleanse with normal saline or wound cleanser     - Dressing:    Honey gel     - Dressing:    Dry gauze     - Dressing:    Conforming roll     - Additional Wound Dressing Information:    tape. zinc oxide to periwound     - Frequency:    Change dressing every other day and PRN   09/11/24 1201 OP Wound Dressing Routine Active Melany Hathaway APRN     - Cleansing:    Cleanse with normal saline or wound cleanser     - Cleansing:    Cleanse with Vashe     - Dressing:    Acticoat     - Dressing:    Dry gauze     - Dressing:    Conforming roll     - Dressing:    Paper tape     - Additional Wound Dressing Information:    betamethasone to periwound     - Frequency:    Change dressing weekly   09/05/24 1659 OP Wound Dressing Routine Active Melany Hathaway APRN     - Cleansing:    Cleanse with normal saline or wound cleanser     - Cleansing:    Cleanse with Vashe     - Dressing:    Honey gel     - Dressing:    Dry gauze     - Dressing:    Conforming roll     - Dressing:    Paper tape     - Frequency:    Change dressing every three days   08/29/24 1421 OP Wound Dressing Routine Active Melany Hathaway APRN     - Cleansing:    Cleanse with normal saline or wound cleanser     - Dressing:    Enluxtra humidifiber     - Dressing:    Conforming roll     - Frequency:    Change dressing two times per week       Inactive Orders   Date Order Priority Status Authorizing Provider   09/19/24 1831 OP Wound Dressing Routine Completed Melany Hathaway APRN     - Cleansing:    Cleanse with normal saline or wound cleanser     - Cleansing:    Cleanse with Vashe     - Dressing:    Honey gel     - Dressing:    Dry gauze     - Dressing:    Kerlix     - Dressing:    Paper tape     - Frequency:    Change dressing every other day and PRN   09/19/24 1021 Debridement Left;Lateral;Proximal Ankle Routine Completed Melany Hathaway APRN    09/11/24 0955 Debridement Left;Lateral;Proximal Ankle Routine Completed Melany Hathaway APRN   09/05/24 1401 Debridement Left;Lateral;Proximal Ankle Routine Completed Melany Hathaway APRN   08/29/24 1415 Debridement Left;Lateral;Proximal Ankle Routine Completed Melany Hathaway APRN       Wound 08/29/24 1 Ankle Left;Lateral;Distal (Active)   Date First Assessed/Time First Assessed: 08/29/24 1115    Wound Number (Wound Clinic Only): 1  Location: (c) Ankle  Wound Location Orientation: Left;Lateral;Distal      Assessments 8/29/2024 11:16 AM 9/25/2024  9:30 AM   Wound Image        Site Assessment Moist;Yellow;Pink;Red Moist;Pink;Yellow   Closure Not approximated Not approximated   Drainage Amount Small Moderate   Drainage Description Yellow Serosanguineous;Yellow   Treatments Cleansed;Saline;Wound  Cleansed;Saline;Vashe;Honey Gel   Dressing Joanne;Tape 4x4s;Gauze;Tape;Joanne   Dressing Changed New Changed   Dressing Status Clean;Dry;Intact Clean;Dry;Intact   Wound Length (cm) 1.4 cm 1.3 cm   Wound Width (cm) 1.3 cm 1.5 cm   Wound Surface Area (cm^2) 1.82 cm^2 1.95 cm^2   Wound Depth (cm) 0.2 cm 0.2 cm   Wound Volume (cm^3) 0.364 cm^3 0.39 cm^3   Wound Healing % -- -7   Margins Well-defined edges Well-defined edges   Non-staged Wound Description Full thickness Full thickness   Angelica-wound Assessment Dry;Intact;Blanchable erythema;Painful Intact;Blanchable erythema;Edema;Hyperpigmented   Wound Granulation Tissue Pink;Red Red;Pink   Wound Bed Granulation (%) 100 % 80 %   Wound Bed Epithelium (%) 0 % 0 %   Wound Bed Slough (%) 20 % 20 %   Wound Bed Eschar (%) 0 % 0 %   Wound Bed Fibrin (%) 0 % 0 %   State of Healing Non-healing Fully granulated   Wound Odor None None       Active Orders   Date Order Priority Status Authorizing Provider   09/25/24 1429 OP Wound Dressing Routine Active Melany Hathaway APRN     - Cleansing:    Cleanse with normal saline or wound cleanser     - Dressing:    Honey gel     -  Dressing:    Dry gauze     - Dressing:    Conforming roll     - Additional Wound Dressing Information:    tape. zinc oxide to periwound     - Frequency:    Change dressing every other day and PRN   09/11/24 1201 OP Wound Dressing Routine Active Melayn Hathaway APRN     - Cleansing:    Cleanse with normal saline or wound cleanser     - Cleansing:    Cleanse with Vashe     - Dressing:    Acticoat     - Dressing:    Dry gauze     - Dressing:    Conforming roll     - Dressing:    Paper tape     - Additional Wound Dressing Information:    betamethasone to periwound     - Frequency:    Change dressing weekly   09/05/24 1659 OP Wound Dressing Routine Active Melany Hathaway APRN     - Cleansing:    Cleanse with normal saline or wound cleanser     - Cleansing:    Cleanse with Vashe     - Dressing:    Honey gel     - Dressing:    Dry gauze     - Dressing:    Conforming roll     - Dressing:    Paper tape     - Frequency:    Change dressing every three days   08/29/24 1421 OP Wound Dressing Routine Active Melany Hathaway APRN     - Cleansing:    Cleanse with normal saline or wound cleanser     - Dressing:    Enluxtra humidifiber     - Dressing:    Conforming roll     - Frequency:    Change dressing two times per week       Inactive Orders   Date Order Priority Status Authorizing Provider   09/19/24 1831 OP Wound Dressing Routine Completed Melany Hathaway APRN     - Cleansing:    Cleanse with normal saline or wound cleanser     - Cleansing:    Cleanse with Vashe     - Dressing:    Honey gel     - Dressing:    Dry gauze     - Dressing:    Kerlix     - Dressing:    Paper tape     - Frequency:    Change dressing every other day and PRN   09/11/24 0958 Debridement Left;Lateral;Distal Ankle Routine Completed Melany Hathaway APRN   09/05/24 1400 Debridement Left;Lateral;Distal Ankle Routine Completed Melany Hathaway APRN   08/29/24 1416 Debridement Left;Lateral;Distal Ankle Routine Completed Melany Hathaway APRN        Compression Wrap 08/29/24 Leg Left;Lower (Active)   Placement Date: 08/29/24   Location: Leg  Wound Location Orientation: Left;Lower      Assessments 8/29/2024 11:16 AM 9/25/2024  9:30 AM   Response to Treatment Well tolerated Well tolerated   Compression Layers Single Single   Compression Product Type Tubigrip/Spanda Tubigrip/Spanda   Dressing Applied Yes Yes   Compression Wrap Location Toes to Knee Toes to Knee   Compression Wrap Status Clean;Dry;Intact Clean;Dry;Intact       No associated orders.       Wound 09/19/24 Ankle Right;Medial (Active)   Date First Assessed/Time First Assessed: 09/19/24 0944   Location: Ankle  Wound Location Orientation: Right;Medial      Assessments 9/19/2024  9:28 AM 9/25/2024  9:30 AM   Wound Image       Site Assessment Dry;Yellow;Pink Dry;Yellow;Pink   Closure Not approximated Not approximated   Drainage Amount None Small   Drainage Description -- Sanguineous   Treatments Cleansed;Saline;Vashe;Honey Gel Cleansed;Saline;Vashe;Honey Gel;Topical (Barrier/Moisturizer/Ointment)   Dressing Kerlix roll;Tape 4x4s;Gauze;Joanne;Tape   Dressing Changed New Changed   Dressing Status Clean;Dry;Intact --   Wound Length (cm) 0.2 cm 0.1 cm   Wound Width (cm) 0.2 cm 0.2 cm   Wound Surface Area (cm^2) 0.04 cm^2 0.02 cm^2   Wound Depth (cm) 0.1 cm 0.1 cm   Wound Volume (cm^3) 0.004 cm^3 0.002 cm^3   Wound Healing % -- 50   Margins Well-defined edges Attached edges   Non-staged Wound Description Full thickness Full thickness   Angelica-wound Assessment Blanchable erythema;Dry Hyperpigmented;Clean;Dry;Swelling;Edema   Wound Granulation Tissue Pink --   Wound Bed Granulation (%) 5 % 0 %   Wound Bed Epithelium (%) 0 % 75 %   Wound Bed Slough (%) 95 % 25 %   Wound Bed Eschar (%) 0 % 0 %   Wound Bed Fibrin (%) 0 % 0 %   State of Healing Non-healing Non-healing   Wound Odor None None       Inactive Orders   Date Order Priority Status Authorizing Provider   09/19/24 183 OP Wound Dressing Routine Completed  Melany Hathaway, STEPHANIE     - Cleansing:    Cleanse with normal saline or wound cleanser     - Cleansing:    Cleanse with Vashe     - Dressing:    Honey gel     - Dressing:    Dry gauze     - Dressing:    Kerlix     - Dressing:    Paper tape     - Frequency:    Change dressing every other day and PRN       Compression Wrap 09/19/24 Leg Right;Lower (Active)   Placement Date: 09/19/24   Location: Leg  Wound Location Orientation: Right;Lower      Assessments 9/19/2024  9:28 AM 9/25/2024  9:30 AM   Response to Treatment Well tolerated Well tolerated   Compression Layers Single Single   Compression Product Type Tubigrip/Spanda Tubigrip/Spanda   Dressing Applied Yes Yes   Compression Wrap Location Toes to Knee Toes to Knee   Compression Wrap Status Clean;Intact;Dry Clean;Intact;Dry       No associated orders.           Procedures  ASSESSMENT AND PLAN:      1. Cellulitis of left lower extremity  - sulfamethoxazole-trimethoprim -160 MG Oral Tab per tablet; Take 1 tablet by mouth 2 (two) times daily for 5 days.  Dispense: 10 tablet; Refill: 0    2. Non-pressure chronic ulcer of left lower leg with fat layer exposed (HCC)  - OP Wound Dressing; Standing    3. Non-pressure chronic ulcer right lower leg, limited to breakdown skin (HCC)  - OP Wound Dressing; Standing    4. Abnormal ankle brachial index (HARJINDER)    Chronic full thickness wounds on lateral left lower leg, present for >6 months. New small wound on right medial ankle. HARJINDER 0.5 on left side, 0.7 on right side. Likely contributing to poor wound healing. Vascular surgery eval scheduled 10/10/24. Hindering factors for wound healing include uncontrolled T2DM, HTN.   Wound measurements similar to previous weeks. Adherent slough in all wound beds, increase in granulation. Periwound with blanchable erythema with increase in warmth--concerning for mild cellulitis--plan to treat with Bactrim DS x 5 days. Slough too adherent for selective debridement. Dry gauze used to  mechanically debride. Wounds were cleansed with Vashe prior to dressing changes.  Wound on left great toe appears to have re-opened, partial thickness.   Wound Care:  -Cleanse wounds with wound cleanser  -Zinc paste to periwound  -Medi-honey and gauze dressing  -Medigrip E for edema management  -Change dressing every 2-3 days and PRN    Continue to keep dressing dry. Wear cast cover if showering.     Continue nutrition for wound healing, encourages reduced carbohydrate intake, increased protein intake, and healthy diet. Recommended increase vitamin intake (either with foods or vitamin supplements), need vitamin A, Bs, C, D and zinc for wound healing.   Continue to work on improving blood sugar control  Risks, benefits, and alternatives of current treatment plan discussed in detail.  Questions and concerns addressed. Red flags to RTC or ED reviewed.  Patient agrees to plan.      Return in about 2 weeks (around 10/9/2024) for APN visit x 30 mins.    I spent 30 minutes with the patient. This time included:  preparing to see the patient (eg, review notes and recent diagnostics), seeing the patient, performing a medically appropriate examination and/or evaluation, counseling and educating the patient, and documenting in the record.    NOTE TO PATIENT: The 21st Century Cures Act makes clinical notes like these available to patients in the interest of transparency. Clinical notes are medical documents used by physicians and care providers to communicate with each other. These documents include medical language and terminology, abbreviations, and treatment information that may sound technical and at times possibly unfamiliar. In addition, at times, the verbiage may appear blunt or direct. These documents are one tool providers use to communicate relevant information and clinical opinions of the care providers in a way that allows common understanding of the clinical context.

## 2024-10-01 ENCOUNTER — APPOINTMENT (OUTPATIENT)
Dept: WOUND CARE | Facility: HOSPITAL | Age: 74
End: 2024-10-01
Attending: NURSE PRACTITIONER
Payer: MEDICAID

## 2024-10-10 ENCOUNTER — OFFICE VISIT (OUTPATIENT)
Dept: WOUND CARE | Facility: HOSPITAL | Age: 74
End: 2024-10-10
Attending: NURSE PRACTITIONER
Payer: MEDICAID

## 2024-10-10 ENCOUNTER — OFFICE VISIT (OUTPATIENT)
Facility: CLINIC | Age: 74
End: 2024-10-10

## 2024-10-10 VITALS — BODY MASS INDEX: 28.25 KG/M2 | RESPIRATION RATE: 18 BRPM | HEIGHT: 67 IN | WEIGHT: 180 LBS

## 2024-10-10 VITALS
OXYGEN SATURATION: 95 % | TEMPERATURE: 97 F | DIASTOLIC BLOOD PRESSURE: 76 MMHG | SYSTOLIC BLOOD PRESSURE: 126 MMHG | HEART RATE: 95 BPM

## 2024-10-10 DIAGNOSIS — L97.922 NON-PRESSURE CHRONIC ULCER OF LEFT LOWER LEG WITH FAT LAYER EXPOSED (HCC): Primary | ICD-10-CM

## 2024-10-10 DIAGNOSIS — L97.911 NON-PRESSURE CHRONIC ULCER RIGHT LOWER LEG, LIMITED TO BREAKDOWN SKIN (HCC): ICD-10-CM

## 2024-10-10 DIAGNOSIS — I70.299 ATHEROSCLEROSIS OF ARTERY OF EXTREMITY WITH ULCERATION (HCC): Primary | ICD-10-CM

## 2024-10-10 DIAGNOSIS — L97.909 ATHEROSCLEROSIS OF ARTERY OF EXTREMITY WITH ULCERATION (HCC): Primary | ICD-10-CM

## 2024-10-10 PROCEDURE — 97597 DBRDMT OPN WND 1ST 20 CM/<: CPT | Performed by: NURSE PRACTITIONER

## 2024-10-10 RX ORDER — METFORMIN HYDROCHLORIDE EXTENDED-RELEASE TABLETS 500 MG/1
TABLET, FILM COATED, EXTENDED RELEASE ORAL
COMMUNITY
Start: 2024-10-09

## 2024-10-10 NOTE — PROGRESS NOTES
Patient ID: Guillermo White is a 74 year old male.    Debridement Left;Lateral;Proximal Ankle   Wound 08/29/24 3 Ankle Left;Lateral;Proximal    Performed by: Melany Hathaway APRN  Authorized by: Melany Hathaway APRN      Consent   Consent obtained? verbal  Consent given by: patient  Risks discussed? procedural risks discussed  Time out called at 10/10/2024 11:39 AM  Immediately prior to the procedure a time out was called and the performing provider verified the correct patient, procedure, equipment, support staff, and site/side marked as required.    Debridement Details  Performed by: NP  Debridement type: conservative sharp  Pain control: benzocaine 20%  Pain control administration type: topical    Pre-debridement measurements  Length (cm): 2  Width (cm): 1.5  Depth (cm): 0.2  Surface Area (cm^2): 3    Post-debridement measurements  Length (cm): 2  Width (cm): 1.4  Depth (cm): 0.2  Percent debrided: 80%  Surface Area (cm^2): 2.8  Area Debrided (cm^2): 2.24  Volume (cm^3): 0.56    Devitalized tissue debrided: necrotic debris and slough  Instrument(s) utilized: blade, curette and scissors  Bleeding: none  Hemostasis obtained with: not applicable  Procedural pain (0-10): 0  Post-procedural pain: 0   Response to treatment: procedure was tolerated well    Debridement Left;Lateral;Distal Ankle   Wound 08/29/24 1 Ankle Left;Lateral;Distal    Performed by: Melany Hathaway APRN  Authorized by: Melany Hathaway APRN      Consent   Consent obtained? verbal  Consent given by: patient  Risks discussed? procedural risks discussed  Time out called at 10/10/2024 11:39 AM  Immediately prior to the procedure a time out was called and the performing provider verified the correct patient, procedure, equipment, support staff, and site/side marked as required.    Debridement Details  Performed by: NP  Debridement type: conservative sharp  Pain control: benzocaine 20%  Pain control administration type: topical    Pre-debridement  measurements  Length (cm): 1.5  Width (cm): 1.4  Depth (cm): 0.2  Surface Area (cm^2): 2.1    Post-debridement measurements  Length (cm): 1.2  Width (cm): 1.2  Depth (cm): 0.2  Percent debrided: 100%  Surface Area (cm^2): 1.44  Area Debrided (cm^2): 1.44  Volume (cm^3): 0.29    Devitalized tissue debrided: biofilm and necrotic debris  Instrument(s) utilized: curette and blade  Bleeding: small  Hemostasis obtained with: pressure  Procedural pain (0-10): 0  Post-procedural pain: 0   Response to treatment: procedure was tolerated well

## 2024-10-10 NOTE — PROGRESS NOTES
Samer F. Najjar, MD  Vascular Surgery  [facility]      VASCULAR SURGERY   CLINIC CONSULT NOTE        Name: Guillermo White   :   1950  [id number]     REFERRING PHYSICIAN:  No ref. provider found  PRIMARY CARE PHYSICIAN:  PHYSICIAN NONSTAFF    HISTORY OF PRESENT ILLNESS:   Patient is a 74 year old male who has been referred regarding management of a left lateral ankle wounds. He has been treated for the past two months at the [facility]. They are Turkish speaking, language line was used for translation. Reports wounds have been present for at least 6 months. He went to [facility] for evaluation of the wounds. He was treated for wound infection with a course of Augmentin with improvement. Pt reports he currently has no pain at the wound site, but will experience sharp pain at the wound site at night time. He was given Gabapentin by [facility], but this has not helped. He denies known history of stroke, blood clots, CHF, PAD, Afib. Reports he takes Plavix for poor circulation. Reports taking Insulin as prescribed.  He underwent arterial testing that revealed moderate to severe decrease in resting ankle-brachial indices, with an HARJINDER of 0.5 on the symptomatic left side.    PAST MEDICAL HISTORY:    Past Medical History:    Aspiration pneumonia ([facility])    3/2023    Diabetes ([facility])    High blood pressure    High cholesterol    Lacunar infarction (HCC)    Osteomyelitis of lumbar spine (HCC)        TIA (transient ischemic attack)    Vestibular schwannoma (HCC)       PAST SURGICAL HISTORY:   Past Surgical History:   Procedure Laterality Date    Back surgery      Brain surgery      vestibular schwannoma @ [facility] w/left facial paralysis    Cataract      Cholecystectomy          MEDICATIONS:     Current Outpatient Medications:     amLODIPine 5 MG Oral Tab, Take 1 tablet (5 mg total) by mouth daily., Disp: , Rfl:     clopidogrel 75 MG Oral Tab, Take 1 tablet (75 mg total) by mouth  daily., Disp: , Rfl:     ergocalciferol 1.25 MG (31667 UT) Oral Cap, Take 1 capsule (50,000 Units total) by mouth once a week., Disp: , Rfl:     gabapentin 100 MG Oral Cap, Take 1 capsule (100 mg total) by mouth 3 (three) times daily., Disp: , Rfl:     LANTUS SOLOSTAR 100 UNIT/ML Subcutaneous Solution Pen-injector, Inject 66 Units into the skin daily., Disp: , Rfl:     Insulin Lispro, 1 Unit Dial, 100 UNIT/ML Subcutaneous Solution Pen-injector, Inject 8 Units as directed 3 (three) times daily., Disp: , Rfl:     BD PEN NEEDLE SHANNON 2ND GEN 32G X 4 MM Does not apply Misc, 1 each 4 (four) times daily., Disp: , Rfl:     losartan-hydroCHLOROthiazide 100-25 MG Oral Tab, Take 1 tablet by mouth daily., Disp: , Rfl:     rosuvastatin 40 MG Oral Tab, Take 1 tablet (40 mg total) by mouth nightly., Disp: , Rfl:     metFORMIN HCl ER, OSM, 500 MG (OSM) Oral Tablet 24 Hr, , Disp: , Rfl:     ALLERGIES:    He has No Known Allergies.    SOCIAL HISTORY:    Patient  reports that he has never smoked. He has never used smokeless tobacco.    FAMILY HISTORY:    Patient's family history is not on file.    ROS:     A 12 point review of systems with pertinent positives and negatives listed in the HPI.    EXAM:    Resp 18   Ht 5' 7\" (1.702 m)   Wt 180 lb (81.6 kg)   BMI 28.19 kg/m²   GENERAL: alert and orientated X 3, well developed, well nourished, in no apparent distress  PSYCH: normal mood and affect  HEENT: ears and throat are clear  NECK: supple, no lymphadenopathy, thyroid wnl  CAROTID: no bruits  RESPIRATORY: no rales, rhonchi, or wheezes B  CARDIO: RRR without murmur, no murmur, no gallop   ABDOMEN: soft, non-tender with no palpable aneurysm or masses  BACK: normal, no tenderness  SKIN: no rashes, warm and dry  EXTREMITIES: no tenderness  NEURO: no sensory or motor deficits  VASCULAR:      Femoral Popliteal DP PT Peroneal   Right 2+       monophasic signal monophasic signal    Left 2+       monophasic signal monophasic signal       Left supralateral malleolar ulcer measuring 1.4 cm    LABS:   No results found for: \"EAG\", \"A1C\"   No results found for: \"GLU\", \"BUN\", \"CREATSERUM\", \"BUNCREA\", \"ANIONGAP\", \"GFRAA\", \"GFRNAA\", \"CA\", \"NA\", \"K\", \"CL\", \"CO2\", \"OSMOCALC\"   No results found for: \"WBC\", \"RBC\", \"HGB\", \"HCT\", \"MCV\", \"MCH\", \"MCHC\", \"RDW\", \"PLT\", \"MPV\"     No results found for: \"HGB\", \"CREATSERUM\"      ASSESSMENT/PLAN:    I have reviewed the patient's prior documentation and studies from Epic and from [employer].    Diagnoses and all orders for this visit:    Atherosclerosis of artery of extremity with ulceration (HCC)     The patient has a non-healing LLE wound that appears to be influenced by poor perfusion. I have recommended a LLE angiogram with intervention. He was felt to be an acceptable candidate for endovascular revascularization. We discussed the benefits of the angiogram and the risks of acute thrombosis, distal embolization, nerve injury, restenosis, dissection or poorly controlled bleeding (which may be manifested as free hemorrhage or contained hematoma in the femoral or retroperitoneal areas), pseudoaneurysm, or arteriovenous fistula, infection, renal function worsening and need for open surgery among other complications. Hemorrhage and hematoma are usually evident within 12 hours after the procedure while other complications, such as pseudoaneurysm and arteriovenous fistula, may not become apparent for days to weeks afterward.  Patient understood and all questions were answered.       The patient indicated an understanding of these issues and agreed to the plan and all questions were answered during the clinic visit.      Thank you for allowing me to participate in your patient's care.   Please do not hesitate to contact me with any questions.    Sincerely,  Samer F. Najjar, MD    Please note: Dragon speech recognition software was used to prepare this note. If a word or phrase is confusing, it is likely do to a failure of  recognition.   Please contact me with any questions or clarifications.

## 2024-10-11 ENCOUNTER — TELEPHONE (OUTPATIENT)
Dept: WOUND CARE | Facility: HOSPITAL | Age: 74
End: 2024-10-11

## 2024-10-11 NOTE — TELEPHONE ENCOUNTER
Patient's daughter called and states that the patient has a pain of 10/10 from his wounds, is asking for a pain medication prescription, informed her that the provider is not available today but she can call the PCP. Instructed also to check the wound and if there's any signs of infection, such as increased redness, foul smelling odor, increased drainage, then the patient needs to go to an urgent care center or immediate care for further evaluation. Daughter verbalized understanding.

## 2024-10-11 NOTE — TELEPHONE ENCOUNTER
Follow up call to patient's daughter who reports she removed the tubigrip and wound dressing. Says his wound looks okay, no s/sx of infection or change in color. Reports the color of his feet and lower legs are at his baseline. Pain is slightly improved since called earlier. Rates as 8/10. He hasn't taken any tylenol recently since it was making him feel nauseas when he last took it. He has an appointment to see his PCP tomorrow to discuss pain management.

## 2024-10-14 ENCOUNTER — TELEPHONE (OUTPATIENT)
Facility: CLINIC | Age: 74
End: 2024-10-14

## 2024-10-14 NOTE — TELEPHONE ENCOUNTER
Patient's daughter states patient was supposed to get a call from this office on Friday regarding scheduling a procedure.  Please catalina.

## 2024-10-14 NOTE — TELEPHONE ENCOUNTER
Patient needs left lower extremity angiogram with intervention.   Called and spoke with patient's daughter Griselda. Procedure scheduled for Wednesday 10/30/2024, she was receptive. Let her know that a nurse from [facility] will be calling with further instructions and will be placing the lab orders that need to be done before procedure. She understood.

## 2024-10-16 NOTE — PROGRESS NOTES
Chief Complaint   Patient presents with    Wound Recheck      ID: [id number]. Pt denies any pain at the site of the wound. Per pt report BGL was 98 this AM.      HPI:   10/10/24: Here today with daughter for follow up on left lateral ankle wounds. Saw Dr. Najjar prior to visit today. Planning for angiogram. Denies changes in health since last visit.    9/25/24: F/u left lateral ankle wounds and right ankle wound. Accompanied by his daughter, language line used for Ethiopian translation. C/o pain at night time, no pain with walking. Itching around ankle as well. Taking Tylenol 550 mg 1 tablet without relief. Has been changing medi-honey dressing as recommended.    9/19/24: F/u left lateral ankle wounds. New wound on right medial ankle. Here with his daughters. Language line used for Ethiopian translation. Pt reports he is doing well. Pain at wound sites is less than at previous visits.     9/11/24: F/u L lateral ankle wounds. Language line translating for the visit. He has been applying medi-honey and gauze to the wounds for the past week. Tolerating well. Daughter reports old wound site on left great toe has intermittent clear drainage. Pt reports pain is well controlled. C/o intermittent itching around left ankle. Daughter is asking to have motorized scooter ordered for patient out of concern for tiredness after walking short distances, imbalance and mild SOB. No pain at wound site when walking    9/5/24: F/u visit on left lateral ankle wounds. Language line translated for visit. Pt tolerated dressing well, but felt dressing was tight. He says his pain varies to little pain up to 7/10. No change in health or new concerns.    8/29/24: 74 yr old male here today with his daughter for evaluation of left lateral ankle wounds. They are Ethiopian speaking, language line was used for translation. Reports wounds have been present for at least 6 months. He went to [facility] ED 8/6/24 for evaluation of the wounds. He was  treated for wound infection with a course of Augmentin 875mg. Pt says the redness around the wound has improved since then. He was previously receiving treatment for the wound in [place]. He has tried medi-honey and what appears to be the equivalent to Santyl. Reports both treatments caused increased pain at the wound site. Since he was at the ED, his daughter has been cleaning with an OTC wound cleanser and wounds have been left open to air. He has been s howering regularly. Additionally, he has wound on his left great toe that they think has healed, occasionally she notices a drop of drainage. Pt reports he currently has no pain at the wound site, but will experience sharp pain at the wound site at night time. He was given Gabapentin by [facility], but this has not helped. He denies known history of blood clots, CHF, PAD, Afib. Reports he takes Plavix for poor circulation. Reports taking Insulin as prescribed.      Medical Problems:  T2DM (uncontrolled)  HTN  Hyperlipidemia    Past Medical History:    Aspiration pneumonia ([facility])    3/2023    Diabetes ([facility])    High blood pressure    High cholesterol    Lacunar infarction ([facility])    Osteomyelitis of lumbar spine ([facility])    2010    TIA (transient ischemic attack)    Vestibular schwannoma ([facility])     Pertinent Labs:     Ref Range & Units 8/5/24 2200   WBC  3.5 - 10.5 K/UL 5.1   RBC  4.20 - 5.80 M/UL 4.65   HGB  13.0 - 17.5 GM/DL 13.5   HCT  38.0 - 54.0 % 40.9     BUN  7 - 22 MG/DL 25 High    CREATININE  0.6 - 1.4 MG/DL 1.07   GLUCOSE  70 - 100 MG/ High      ESTIMATED GFR  >59 ML/MIN/1.73M2 73     SED RATE  0 - 25 MM 19     C REACTIVE PROTEIN  <8.1 MG/L 2.0     HARJINDER Result 9/11/24:   PROCEDURE: US ANKLE BRACHIAL INDEX (CPT=93922)    COMPARISON: None.   INDICATIONS: E11.59 Type 2 diabetes mellitus with other circulatory complications ([facility]) L97.922 Non-pressure chronic ulcer of left lower leg with fat layer exposed ([facility])      TECHNIQUE: Segmental pressures and Doppler wave  forms were obtained in the ankles and feet.       SEGMENTAL BP   PRESSURE INDEX      RIGHT   BRACHIAL: 150 mmHg   POSTERIOR TIBIAL: 87 mmHg 0.6   DORSALIS PEDIS: 100 mmHg 0.7       LEFT   BRACHIAL: 143 mmHg   POSTERIOR TIBIAL: 80 mmHg 0.5   DORSALIS PEDIS: 76 mmHg 0.5        Impression   CONCLUSION:  1. Moderately moderate to severe decrease in resting ankle-brachial indices, with an HARJINDER of 0.5 on the symptomatic left side.       Outside Imaging:  EXAM: AP and lateral views of the left tibia and fibula   DATE: 8/5/2024 11:49 PM   CLINICAL INDICATION: ulceration two months, treated in [place], seems worse to   family.   COMPARISON: None.   FINDINGS:   Bones:There is no acute fracture or dislocation.   Joints: Joint spaces are intact without degenerative proliferation.   Soft Tissues: Soft tissue ulceration over the lateral aspect of the distal lower   extremity.   IMPRESSION:   No acute fracture or dislocation of the left tibia or fibula.  No osseous   erosion.  Soft tissue ulceration distally.     EXAM: Non-Standing AP, lateral and oblique views of the left foot   DATE: 8/5/2024 11:49 PM   CLINICAL INDICATION: ulcerations left foot at base of great toe and left lower   leg distally/laterally.    COMPARISON: None.   FINDINGS:   Bones: There is no acute fracture or dislocation.    Joints: Joint spaces show mild degenerative proliferation.   Soft Tissues: Focal soft tissue swelling at the medial aspect of the hallux   distal phalanx.     IMPRESSION:   No acute fracture-dislocation left foot.  No osseous erosion.  Soft tissue   swelling.       Current Outpatient Medications:     metFORMIN HCl ER, OSM, 500 MG (OSM) Oral Tablet 24 Hr, , Disp: , Rfl:     amLODIPine 5 MG Oral Tab, Take 1 tablet (5 mg total) by mouth daily., Disp: , Rfl:     clopidogrel 75 MG Oral Tab, Take 1 tablet (75 mg total) by mouth daily., Disp: , Rfl:     ergocalciferol 1.25 MG (83006 UT) Oral Cap, Take 1 capsule (50,000 Units total) by mouth once a  week., Disp: , Rfl:     gabapentin 100 MG Oral Cap, Take 1 capsule (100 mg total) by mouth 3 (three) times daily., Disp: , Rfl:     LANTUS SOLOSTAR 100 UNIT/ML Subcutaneous Solution Pen-injector, Inject 66 Units into the skin daily., Disp: , Rfl:     Insulin Lispro, 1 Unit Dial, 100 UNIT/ML Subcutaneous Solution Pen-injector, Inject 8 Units as directed 3 (three) times daily., Disp: , Rfl:     BD PEN NEEDLE SHANNON 2ND GEN 32G X 4 MM Does not apply Misc, 1 each 4 (four) times daily., Disp: , Rfl:     losartan-hydroCHLOROthiazide 100-25 MG Oral Tab, Take 1 tablet by mouth daily., Disp: , Rfl:     rosuvastatin 40 MG Oral Tab, Take 1 tablet (40 mg total) by mouth nightly., Disp: , Rfl:     Allergies:  No Known Allergies     REVIEW OF SYSTEMS:   Review of Systems   Constitutional:  Negative for activity change, appetite change, chills, fatigue and fever.   Respiratory:  Negative for shortness of breath.    Cardiovascular:  Negative for chest pain.   Gastrointestinal:  Negative for abdominal pain.   Musculoskeletal:  Positive for gait problem (ambulates with cane).   Skin:  Positive for wound. Negative for rash.   Neurological:  Negative for dizziness, numbness and headaches.   Psychiatric/Behavioral:          Daughter reports he sometimes gets depressed by non-healing wound     PHYSICAL EXAM:   /76   Pulse 95   Temp 97.4 °F (36.3 °C)   SpO2 95%    Estimated body mass index is 28.19 kg/m² as calculated from the following:    Height as of an earlier encounter on 10/10/24: 67\".    Weight as of an earlier encounter on 10/10/24: 180 lb.   Vital signs reviewed.Appears stated age, well groomed.    Physical Exam  Constitutional:       General: He is not in acute distress.     Appearance: Normal appearance. He is not ill-appearing.   HENT:      Head: Normocephalic and atraumatic.   Cardiovascular:      Rate and Rhythm: Normal rate.      Pulses:           Dorsalis pedis pulses are 2+ on the right side and 2+ on the left  side.        Posterior tibial pulses are 2+ on the right side and 2+ on the left side.      Comments: 24 handheld doppler findings of biphasic wave sounds at right DP and PT pulses, monophasic wave sounds left DP and PT.    Pulmonary:      Effort: Pulmonary effort is normal.   Musculoskeletal:      Right lower leg: Edema present.      Left lower le+ Pitting Edema present.   Skin:     General: Skin is warm and dry.      Capillary Refill: Capillary refill takes 2 to 3 seconds.      Findings: Wound (left lateral ankle, left 1st toe R ankle; see wound care flowsheet) present. No rash.   Neurological:      General: No focal deficit present.      Mental Status: He is alert and oriented to person, place, and time.   Psychiatric:         Mood and Affect: Mood normal.         Behavior: Behavior normal.       Wound 24 3 Ankle Left;Lateral;Proximal (Active)   Date First Assessed/Time First Assessed: 24 1106    Wound Number (Wound Clinic Only): 3  Location: Ankle  Wound Location Orientation: Left;Lateral;Proximal      Assessments 2024 11:16 AM 10/10/2024 11:14 AM   Wound Image         Site Assessment Moist;Yellow Dry;Yellow;Brown   Closure Not approximated Not approximated   Drainage Amount Scant Small   Drainage Description Yellow Serosanguineous   Treatments Cleansed;Wound ;Saline Cleansed;Saline;Vashe   Dressing Joanne;Tape Hydrofera transfer;Kerlix roll;Tape   Dressing Changed New Changed   Dressing Status Dry;Intact;Clean Dry;Intact;Clean   Wound Length (cm) 1.9 cm 2 cm   Wound Width (cm) 1.6 cm 1.5 cm   Wound Surface Area (cm^2) 3.04 cm^2 3 cm^2   Wound Depth (cm) 0.1 cm 0.2 cm   Wound Volume (cm^3) 0.304 cm^3 0.6 cm^3   Wound Healing % -- -97   Margins Attached edges Well-defined edges   Non-staged Wound Description Full thickness Full thickness   Angelica-wound Assessment Clean;Dry;Blanchable erythema;Painful Dry;Pink   Wound Bed Granulation (%) 10 % 0 %   Wound Bed Epithelium (%) 0 % 0 %    Wound Bed Slough (%) 90 % 0 %   Wound Bed Eschar (%) 0 % 0 %   Wound Bed Fibrin (%) 0 % 100 %   State of Healing Non-healing Non-healing   Wound Odor None None       Active Orders   Date Order Priority Status Authorizing Provider   09/25/24 1429 OP Wound Dressing Routine Active Melany Hathaway APRN     - Cleansing:    Cleanse with normal saline or wound cleanser     - Dressing:    Honey gel     - Dressing:    Dry gauze     - Dressing:    Conforming roll     - Additional Wound Dressing Information:    tape. zinc oxide to periwound     - Frequency:    Change dressing every other day and PRN   09/11/24 1201 OP Wound Dressing Routine Active Melany Hathaway APRN     - Cleansing:    Cleanse with normal saline or wound cleanser     - Cleansing:    Cleanse with Vashe     - Dressing:    Acticoat     - Dressing:    Dry gauze     - Dressing:    Conforming roll     - Dressing:    Paper tape     - Additional Wound Dressing Information:    betamethasone to periwound     - Frequency:    Change dressing weekly   09/05/24 1659 OP Wound Dressing Routine Active Melany Hathaway APRN     - Cleansing:    Cleanse with normal saline or wound cleanser     - Cleansing:    Cleanse with Vashe     - Dressing:    Honey gel     - Dressing:    Dry gauze     - Dressing:    Conforming roll     - Dressing:    Paper tape     - Frequency:    Change dressing every three days   08/29/24 1421 OP Wound Dressing Routine Active Melany Hathaway APRN     - Cleansing:    Cleanse with normal saline or wound cleanser     - Dressing:    Enluxtra humidifiber     - Dressing:    Conforming roll     - Frequency:    Change dressing two times per week       Inactive Orders   Date Order Priority Status Authorizing Provider   10/10/24 1138 Debridement Left;Lateral;Proximal Ankle Routine Completed Melany Hathaway APRN   09/19/24 1831 OP Wound Dressing Routine Completed Melany Hathaway APRN     - Cleansing:    Cleanse with normal saline or wound cleanser     -  Cleansing:    Cleanse with Vashe     - Dressing:    Honey gel     - Dressing:    Dry gauze     - Dressing:    Kerlix     - Dressing:    Paper tape     - Frequency:    Change dressing every other day and PRN   09/19/24 1021 Debridement Left;Lateral;Proximal Ankle Routine Completed Melany Hathaway, APRN   09/11/24 0955 Debridement Left;Lateral;Proximal Ankle Routine Completed Melany Hathaway, STEPHANIE   09/05/24 1401 Debridement Left;Lateral;Proximal Ankle Routine Completed Melany Hathaway, STEPHANIE   08/29/24 1415 Debridement Left;Lateral;Proximal Ankle Routine Completed Melany Hathaway, STEPHANIE       Wound 08/29/24 1 Ankle Left;Lateral;Distal (Active)   Date First Assessed/Time First Assessed: 08/29/24 1115    Wound Number (Wound Clinic Only): 1  Location: (c) Ankle  Wound Location Orientation: Left;Lateral;Distal      Assessments 8/29/2024 11:16 AM 10/10/2024 11:14 AM   Wound Image         Site Assessment Moist;Yellow;Pink;Red Dry;Brown;Red   Closure Not approximated Not approximated   Drainage Amount Small Small   Drainage Description Yellow Serosanguineous;Scabbed   Treatments Cleansed;Saline;Wound  Cleansed;Saline;Vashe;Topical (Barrier/Moisturizer/Ointment)   Dressing Joanne;Tape Hydrofera transfer;Kerlix roll;Tape   Dressing Changed New Changed   Dressing Status Clean;Dry;Intact Clean;Dry;Intact   Wound Length (cm) 1.4 cm 1.5 cm   Wound Width (cm) 1.3 cm 1.4 cm   Wound Surface Area (cm^2) 1.82 cm^2 2.1 cm^2   Wound Depth (cm) 0.2 cm 0.2 cm   Wound Volume (cm^3) 0.364 cm^3 0.42 cm^3   Wound Healing % -- -15   Margins Well-defined edges Well-defined edges   Non-staged Wound Description Full thickness Full thickness   Angelica-wound Assessment Dry;Intact;Blanchable erythema;Painful Dry;Pink   Wound Granulation Tissue Pink;Red Red   Wound Bed Granulation (%) 100 % 5 %   Wound Bed Epithelium (%) 0 % 0 %   Wound Bed Slough (%) 20 % 0 %   Wound Bed Eschar (%) 0 % 0 %   Wound Bed Fibrin (%) 0 % 95 %   State of Healing  Non-healing Non-healing   Wound Odor None None       Active Orders   Date Order Priority Status Authorizing Provider   09/25/24 1429 OP Wound Dressing Routine Active Melany Hathaway APRN     - Cleansing:    Cleanse with normal saline or wound cleanser     - Dressing:    Honey gel     - Dressing:    Dry gauze     - Dressing:    Conforming roll     - Additional Wound Dressing Information:    tape. zinc oxide to periwound     - Frequency:    Change dressing every other day and PRN   09/11/24 1201 OP Wound Dressing Routine Active Melany Hathaway APRN     - Cleansing:    Cleanse with normal saline or wound cleanser     - Cleansing:    Cleanse with Vashe     - Dressing:    Acticoat     - Dressing:    Dry gauze     - Dressing:    Conforming roll     - Dressing:    Paper tape     - Additional Wound Dressing Information:    betamethasone to periwound     - Frequency:    Change dressing weekly   09/05/24 1659 OP Wound Dressing Routine Active Melany Hathaway APRN     - Cleansing:    Cleanse with normal saline or wound cleanser     - Cleansing:    Cleanse with Vashe     - Dressing:    Honey gel     - Dressing:    Dry gauze     - Dressing:    Conforming roll     - Dressing:    Paper tape     - Frequency:    Change dressing every three days   08/29/24 1421 OP Wound Dressing Routine Active Melany Hathaway APRN     - Cleansing:    Cleanse with normal saline or wound cleanser     - Dressing:    Enluxtra humidifiber     - Dressing:    Conforming roll     - Frequency:    Change dressing two times per week       Inactive Orders   Date Order Priority Status Authorizing Provider   10/10/24 1150 Debridement Left;Lateral;Distal Ankle Routine Completed Melany Hathaway APRN   09/19/24 1831 OP Wound Dressing Routine Completed Melany Hathaway APRN     - Cleansing:    Cleanse with normal saline or wound cleanser     - Cleansing:    Cleanse with Vashe     - Dressing:    Honey gel     - Dressing:    Dry gauze     - Dressing:    Kerlix      - Dressing:    Paper tape     - Frequency:    Change dressing every other day and PRN   09/11/24 0958 Debridement Left;Lateral;Distal Ankle Routine Completed Melany Hathaway APRN   09/05/24 1400 Debridement Left;Lateral;Distal Ankle Routine Completed Melany Hathaway APRN   08/29/24 1416 Debridement Left;Lateral;Distal Ankle Routine Completed Melany Hathaway, STEPHANIE       Compression Wrap 08/29/24 Leg Left;Lower (Active)   Placement Date: 08/29/24   Location: Leg  Wound Location Orientation: Left;Lower      Assessments 8/29/2024 11:16 AM 10/10/2024 11:14 AM   Response to Treatment Well tolerated Well tolerated   Compression Layers Single Single   Compression Product Type Tubigrip/Spanda Tubigrip/Spanda   Dressing Applied Yes Yes   Compression Wrap Location Toes to Knee Toes to Knee   Compression Wrap Status Clean;Dry;Intact Clean;Dry;Intact       No associated orders.       Wound 09/19/24 Ankle Right;Medial (Active)   Date First Assessed/Time First Assessed: 09/19/24 0944   Location: Ankle  Wound Location Orientation: Right;Medial      Assessments 9/19/2024  9:28 AM 10/10/2024 11:14 AM   Wound Image       Site Assessment Dry;Yellow;Pink Dry;Brown   Closure Not approximated Not approximated   Drainage Amount None None   Drainage Description -- Scabbed   Treatments Cleansed;Saline;Vashe;Honey Gel Cleansed;Saline;Vashe;Topical (Barrier/Moisturizer/Ointment)   Dressing Kerlix roll;Tape Hydrofera transfer;Joanne;Tape   Dressing Changed New Changed   Dressing Status Clean;Dry;Intact Clean;Dry;Intact   Wound Length (cm) 0.2 cm 0.3 cm   Wound Width (cm) 0.2 cm 0.3 cm   Wound Surface Area (cm^2) 0.04 cm^2 0.09 cm^2   Wound Depth (cm) 0.1 cm 0.1 cm   Wound Volume (cm^3) 0.004 cm^3 0.009 cm^3   Wound Healing % -- -125   Margins Well-defined edges Attached edges   Non-staged Wound Description Full thickness Full thickness   Angelica-wound Assessment Blanchable erythema;Dry Hyperpigmented;Clean;Dry;Swelling;Edema   Wound  Granulation Tissue Pink --   Wound Bed Granulation (%) 5 % 0 %   Wound Bed Epithelium (%) 0 % 0 %   Wound Bed Slough (%) 95 % 0 %   Wound Bed Eschar (%) 0 % 0 %   Wound Bed Fibrin (%) 0 % 100 %   State of Healing Non-healing Non-healing   Wound Odor None None       Inactive Orders   Date Order Priority Status Authorizing Provider   09/19/24 1831 OP Wound Dressing Routine Completed Melany Hathaway APRN     - Cleansing:    Cleanse with normal saline or wound cleanser     - Cleansing:    Cleanse with Vashe     - Dressing:    Honey gel     - Dressing:    Dry gauze     - Dressing:    Kerlix     - Dressing:    Paper tape     - Frequency:    Change dressing every other day and PRN       Compression Wrap 09/19/24 Leg Right;Lower (Active)   Placement Date: 09/19/24   Location: Leg  Wound Location Orientation: Right;Lower      Assessments 9/19/2024  9:28 AM 10/10/2024 11:14 AM   Response to Treatment Well tolerated Well tolerated   Compression Layers Single Single   Compression Product Type Tubigrip/Spanda Tubigrip/Spanda   Dressing Applied Yes Yes   Compression Wrap Location Toes to Knee Toes to Knee   Compression Wrap Status Clean;Intact;Dry Clean;Intact;Dry       No associated orders.           Procedures  ASSESSMENT AND PLAN:      1. Non-pressure chronic ulcer of left lower leg with fat layer exposed (HCC)    2. Non-pressure chronic ulcer right lower leg, limited to breakdown skin (HCC)    Chronic full thickness wounds on lateral left lower leg, present for >6 months. New small wound on right medial ankle. HARJINDER 0.5 on left side, 0.7 on right side. Likely contributing to poor wound healing. Vascular surgery eval today, planning for angiogram. Hindering factors for wound healing include uncontrolled T2DM, HTN.   Wound measurements improved. Fibrin removed from wounds on left lateral ankle, see procedure note. Wound bed with granulation and slough present. Periwounds clean and dry. No s/sx of infection. Wounds were cleansed  with Vashe prior to dressing changes.  Wound on left great toe appears to have re-opened, partial thickness.   Wound Care:  -Cleanse wounds with wound cleanser  -Zinc paste to periwound  -Hydrofera blue secured with rolled gauze and tape  -Medigrip E for edema management  -Change dressing every 2-3 days and PRN    Continue to keep dressing dry. Wear cast cover if showering.     Continue nutrition for wound healing, encourages reduced carbohydrate intake, increased protein intake, and healthy diet. Recommended increase vitamin intake (either with foods or vitamin supplements), need vitamin A, Bs, C, D and zinc for wound healing.   Continue to work on improving blood sugar control  Risks, benefits, and alternatives of current treatment plan discussed in detail.  Questions and concerns addressed. Red flags to RTC or ED reviewed.  Patient agrees to plan.      Return in about 1 week (around 10/17/2024) for APN visit x 30 mins.    I spent 30 minutes with the patient. This time included:  preparing to see the patient (eg, review notes and recent diagnostics), seeing the patient, performing a medically appropriate examination and/or evaluation, counseling and educating the patient, and documenting in the record.    NOTE TO PATIENT: The 21st Century Cures Act makes clinical notes like these available to patients in the interest of transparency. Clinical notes are medical documents used by physicians and care providers to communicate with each other. These documents include medical language and terminology, abbreviations, and treatment information that may sound technical and at times possibly unfamiliar. In addition, at times, the verbiage may appear blunt or direct. These documents are one tool providers use to communicate relevant information and clinical opinions of the care providers in a way that allows common understanding of the clinical context.

## 2024-10-17 ENCOUNTER — OFFICE VISIT (OUTPATIENT)
Dept: WOUND CARE | Facility: HOSPITAL | Age: 74
End: 2024-10-17
Attending: NURSE PRACTITIONER
Payer: MEDICAID

## 2024-10-17 VITALS
RESPIRATION RATE: 16 BRPM | SYSTOLIC BLOOD PRESSURE: 124 MMHG | TEMPERATURE: 98 F | DIASTOLIC BLOOD PRESSURE: 73 MMHG | HEART RATE: 94 BPM | OXYGEN SATURATION: 95 %

## 2024-10-17 DIAGNOSIS — L97.911 NON-PRESSURE CHRONIC ULCER RIGHT LOWER LEG, LIMITED TO BREAKDOWN SKIN (HCC): ICD-10-CM

## 2024-10-17 DIAGNOSIS — L97.922 NON-PRESSURE CHRONIC ULCER OF LEFT LOWER LEG WITH FAT LAYER EXPOSED (HCC): Primary | ICD-10-CM

## 2024-10-17 PROCEDURE — 99213 OFFICE O/P EST LOW 20 MIN: CPT | Performed by: NURSE PRACTITIONER

## 2024-10-17 NOTE — PROGRESS NOTES
Chief Complaint   Patient presents with    Wound Care     Left Ankle     HPI:   10/17/24: F/u visit lower leg wounds. Scheduled for angiogram 10/30/24. Reports he does not currently have any pain.     10/10/24: Here today with daughter for follow up on left lateral ankle wounds. Saw Dr. Najjar prior to visit today. Planning for angiogram. Denies changes in health since last visit.    9/25/24: F/u left lateral ankle wounds and right ankle wound. Accompanied by his daughter, language line used for Filipino translation. C/o pain at night time, no pain with walking. Itching around ankle as well. Taking Tylenol 550 mg 1 tablet without relief. Has been changing medi-honey dressing as recommended.    9/19/24: F/u left lateral ankle wounds. New wound on right medial ankle. Here with his daughters. Language line used for Filipino translation. Pt reports he is doing well. Pain at wound sites is less than at previous visits.     9/11/24: F/u L lateral ankle wounds. Language line translating for the visit. He has been applying medi-honey and gauze to the wounds for the past week. Tolerating well. Daughter reports old wound site on left great toe has intermittent clear drainage. Pt reports pain is well controlled. C/o intermittent itching around left ankle. Daughter is asking to have motorized scooter ordered for patient out of concern for tiredness after walking short distances, imbalance and mild SOB. No pain at wound site when walking    9/5/24: F/u visit on left lateral ankle wounds. Language line translated for visit. Pt tolerated dressing well, but felt dressing was tight. He says his pain varies to little pain up to 7/10. No change in health or new concerns.    8/29/24: 74 yr old male here today with his daughter for evaluation of left lateral ankle wounds. They are Filipino speaking, language line was used for translation. Reports wounds have been present for at least 6 months. He went to [facility] ED 8/6/24 for evaluation of  the wounds. He was treated for wound infection with a course of Augmentin 875mg. Pt says the redness around the wound has improved since then. He was previously receiving treatment for the wound in [place]. He has tried medi-honey and what appears to be the equivalent to Santyl. Reports both treatments caused increased pain at the wound site. Since he was at the ED, his daughter has been cleaning with an OTC wound cleanser and wounds have been left open to air. He has been s howering regularly. Additionally, he has wound on his left great toe that they think has healed, occasionally she notices a drop of drainage. Pt reports he currently has no pain at the wound site, but will experience sharp pain at the wound site at night time. He was given Gabapentin by [facility], but this has not helped. He denies known history of blood clots, CHF, PAD, Afib. Reports he takes Plavix for poor circulation. Reports taking Insulin as prescribed.      Medical Problems:  T2DM (uncontrolled)  HTN  Hyperlipidemia    Past Medical History:    Aspiration pneumonia ([facility])    3/2023    Diabetes ([facility]ly [facility])    High blood pressure    High cholesterol    Lacunar infarction ([facility]ly [facility])    Osteomyelitis of lumbar spine ([facility]ly [facility])    2010    TIA (transient ischemic attack)    Vestibular schwannoma (Formerly [facility])     Pertinent Labs:     Ref Range & Units 8/5/24 2200   WBC  3.5 - 10.5 K/UL 5.1   RBC  4.20 - 5.80 M/UL 4.65   HGB  13.0 - 17.5 GM/DL 13.5   HCT  38.0 - 54.0 % 40.9     BUN  7 - 22 MG/DL 25 High    CREATININE  0.6 - 1.4 MG/DL 1.07   GLUCOSE  70 - 100 MG/ High      ESTIMATED GFR  >59 ML/MIN/1.73M2 73     SED RATE  0 - 25 MM 19     C REACTIVE PROTEIN  <8.1 MG/L 2.0     HARJINDER Result 9/11/24:   PROCEDURE: US ANKLE BRACHIAL INDEX (CPT=93922)    COMPARISON: None.   INDICATIONS: E11.59 Type 2 diabetes mellitus with other circulatory complications ([facility]) L97.922 Non-pressure chronic ulcer of left lower leg with fat layer exposed ([facility])      TECHNIQUE: Segmental pressures  and Doppler wave forms were obtained in the ankles and feet.       SEGMENTAL BP   PRESSURE INDEX      RIGHT   BRACHIAL: 150 mmHg   POSTERIOR TIBIAL: 87 mmHg 0.6   DORSALIS PEDIS: 100 mmHg 0.7       LEFT   BRACHIAL: 143 mmHg   POSTERIOR TIBIAL: 80 mmHg 0.5   DORSALIS PEDIS: 76 mmHg 0.5        Impression   CONCLUSION:  1. Moderately moderate to severe decrease in resting ankle-brachial indices, with an HARJINDER of 0.5 on the symptomatic left side.       Outside Imaging:  EXAM: AP and lateral views of the left tibia and fibula   DATE: 8/5/2024 11:49 PM   CLINICAL INDICATION: ulceration two months, treated in [place], seems worse to   family.   COMPARISON: None.   FINDINGS:   Bones:There is no acute fracture or dislocation.   Joints: Joint spaces are intact without degenerative proliferation.   Soft Tissues: Soft tissue ulceration over the lateral aspect of the distal lower   extremity.   IMPRESSION:   No acute fracture or dislocation of the left tibia or fibula.  No osseous   erosion.  Soft tissue ulceration distally.     EXAM: Non-Standing AP, lateral and oblique views of the left foot   DATE: 8/5/2024 11:49 PM   CLINICAL INDICATION: ulcerations left foot at base of great toe and left lower   leg distally/laterally.    COMPARISON: None.   FINDINGS:   Bones: There is no acute fracture or dislocation.    Joints: Joint spaces show mild degenerative proliferation.   Soft Tissues: Focal soft tissue swelling at the medial aspect of the hallux   distal phalanx.     IMPRESSION:   No acute fracture-dislocation left foot.  No osseous erosion.  Soft tissue   swelling.       Current Outpatient Medications:     metFORMIN HCl ER, OSM, 500 MG (OSM) Oral Tablet 24 Hr, , Disp: , Rfl:     amLODIPine 5 MG Oral Tab, Take 1 tablet (5 mg total) by mouth daily., Disp: , Rfl:     clopidogrel 75 MG Oral Tab, Take 1 tablet (75 mg total) by mouth daily., Disp: , Rfl:     ergocalciferol 1.25 MG (19014 UT) Oral Cap, Take 1 capsule (50,000 Units total)  by mouth once a week., Disp: , Rfl:     gabapentin 100 MG Oral Cap, Take 1 capsule (100 mg total) by mouth 3 (three) times daily., Disp: , Rfl:     LANTUS SOLOSTAR 100 UNIT/ML Subcutaneous Solution Pen-injector, Inject 66 Units into the skin daily., Disp: , Rfl:     Insulin Lispro, 1 Unit Dial, 100 UNIT/ML Subcutaneous Solution Pen-injector, Inject 8 Units as directed 3 (three) times daily., Disp: , Rfl:     BD PEN NEEDLE SHANNON 2ND GEN 32G X 4 MM Does not apply Misc, 1 each 4 (four) times daily., Disp: , Rfl:     losartan-hydroCHLOROthiazide 100-25 MG Oral Tab, Take 1 tablet by mouth daily., Disp: , Rfl:     rosuvastatin 40 MG Oral Tab, Take 1 tablet (40 mg total) by mouth nightly., Disp: , Rfl:     Allergies:  No Known Allergies     REVIEW OF SYSTEMS:   Review of Systems   Constitutional:  Negative for activity change, appetite change, chills, fatigue and fever.   Respiratory:  Negative for shortness of breath.    Cardiovascular:  Negative for chest pain.   Gastrointestinal:  Negative for abdominal pain.   Musculoskeletal:  Positive for gait problem (ambulates with cane).   Skin:  Positive for wound. Negative for rash.   Neurological:  Negative for dizziness, numbness and headaches.   Psychiatric/Behavioral:          Daughter reports he sometimes gets depressed by non-healing wound     PHYSICAL EXAM:   /73   Pulse 94   Temp 97.9 °F (36.6 °C) (Oral)   Resp 16   SpO2 95%    Estimated body mass index is 28.19 kg/m² as calculated from the following:    Height as of an earlier encounter on 10/17/24: 67\".    Weight as of an earlier encounter on 10/17/24: 180 lb.   Vital signs reviewed.Appears stated age, well groomed.    Physical Exam  Constitutional:       General: He is not in acute distress.     Appearance: Normal appearance. He is not ill-appearing.   HENT:      Head: Normocephalic and atraumatic.   Cardiovascular:      Rate and Rhythm: Normal rate.      Pulses:           Dorsalis pedis pulses are 2+ on the  right side and 2+ on the left side.        Posterior tibial pulses are 2+ on the right side and 2+ on the left side.      Comments: 24 handheld doppler findings of biphasic wave sounds at right DP and PT pulses, monophasic wave sounds left DP and PT.    Pulmonary:      Effort: Pulmonary effort is normal.   Musculoskeletal:      Right lower leg: Edema present.      Left lower le+ Pitting Edema present.   Skin:     General: Skin is warm and dry.      Capillary Refill: Capillary refill takes 2 to 3 seconds.      Findings: Wound (left lateral ankle, left 1st toe R ankle; see wound care flowsheet) present. No rash.   Neurological:      General: No focal deficit present.      Mental Status: He is alert and oriented to person, place, and time.   Psychiatric:         Mood and Affect: Mood normal.         Behavior: Behavior normal.       Wound 24 3 Ankle Left;Lateral;Proximal (Active)   Date First Assessed/Time First Assessed: 24 1106    Wound Number (Wound Clinic Only): 3  Location: Ankle  Wound Location Orientation: Left;Lateral;Proximal      Assessments 2024 11:16 AM 10/17/2024 11:18 AM   Wound Image        Site Assessment Moist;Yellow Dry;Yellow;Pink   Closure Not approximated Not approximated   Drainage Amount Scant Scant   Drainage Description Yellow Serosanguineous   Treatments Cleansed;Wound ;Saline Cleansed;Saline;Vashe;Honey Gel   Dressing Joanne;Tape Hydrofera transfer;Joanne;Tape   Dressing Changed New Changed   Dressing Status Dry;Intact;Clean Dry;Intact;Clean   Wound Length (cm) 1.9 cm 2 cm   Wound Width (cm) 1.6 cm 1.5 cm   Wound Surface Area (cm^2) 3.04 cm^2 3 cm^2   Wound Depth (cm) 0.1 cm 0.1 cm   Wound Volume (cm^3) 0.304 cm^3 0.3 cm^3   Wound Healing % -- 1   Margins Attached edges Well-defined edges   Non-staged Wound Description Full thickness Full thickness   Angelica-wound Assessment Clean;Dry;Blanchable erythema;Painful Dry;Pink   Wound Bed Granulation (%) 10 % 10 %   Wound  Bed Epithelium (%) 0 % 0 %   Wound Bed Slough (%) 90 % 90 %   Wound Bed Eschar (%) 0 % 0 %   Wound Bed Fibrin (%) 0 % 0 %   State of Healing Non-healing Non-healing   Wound Odor None None       Active Orders   Date Order Priority Status Authorizing Provider   09/25/24 1429 OP Wound Dressing Routine Active Melany Hathaway APRN     - Cleansing:    Cleanse with normal saline or wound cleanser     - Dressing:    Honey gel     - Dressing:    Dry gauze     - Dressing:    Conforming roll     - Additional Wound Dressing Information:    tape. zinc oxide to periwound     - Frequency:    Change dressing every other day and PRN   09/11/24 1201 OP Wound Dressing Routine Active Melany Hathaway APRN     - Cleansing:    Cleanse with normal saline or wound cleanser     - Cleansing:    Cleanse with Vashe     - Dressing:    Acticoat     - Dressing:    Dry gauze     - Dressing:    Conforming roll     - Dressing:    Paper tape     - Additional Wound Dressing Information:    betamethasone to periwound     - Frequency:    Change dressing weekly   09/05/24 1659 OP Wound Dressing Routine Active Melany Hathaway APRN     - Cleansing:    Cleanse with normal saline or wound cleanser     - Cleansing:    Cleanse with Vashe     - Dressing:    Honey gel     - Dressing:    Dry gauze     - Dressing:    Conforming roll     - Dressing:    Paper tape     - Frequency:    Change dressing every three days   08/29/24 1421 OP Wound Dressing Routine Active Melany Hathaway APRN     - Cleansing:    Cleanse with normal saline or wound cleanser     - Dressing:    Enluxtra humidifiber     - Dressing:    Conforming roll     - Frequency:    Change dressing two times per week       Inactive Orders   Date Order Priority Status Authorizing Provider   10/17/24 1233 OP Wound Dressing Routine Completed Melany Hathaway APRN     - Cleansing:    Cleanse with normal saline or wound cleanser     - Dressing:    Honey gel     - Dressing:    Hydrofera transfer     -  Dressing:    Conforming roll     - Additional Wound Dressing Information:    tape     - Frequency:    Change dressing every other day and PRN (prn)   10/16/24 1852 OP Wound Dressing Routine Completed Melany Hathaway APRN     - Cleansing:    Cleanse with normal saline or wound cleanser     - Cleansing:    Cleanse with Vashe     - Dressing:    Hydrofera transfer     - Dressing:    Dry gauze     - Dressing:    Conforming roll     - Frequency:    Change dressing two times per week (and prn)   10/10/24 1138 Debridement Left;Lateral;Proximal Ankle Routine Completed Melany Hathaway, STEPHANIE   09/19/24 1831 OP Wound Dressing Routine Completed Melany Hathaway APRN     - Cleansing:    Cleanse with normal saline or wound cleanser     - Cleansing:    Cleanse with Vashe     - Dressing:    Honey gel     - Dressing:    Dry gauze     - Dressing:    Kerlix     - Dressing:    Paper tape     - Frequency:    Change dressing every other day and PRN   09/19/24 1021 Debridement Left;Lateral;Proximal Ankle Routine Completed Melany Hathaway APRN   09/11/24 0955 Debridement Left;Lateral;Proximal Ankle Routine Completed Melany Hathaway APRN   09/05/24 1401 Debridement Left;Lateral;Proximal Ankle Routine Completed Melany Hathaway APRN   08/29/24 1415 Debridement Left;Lateral;Proximal Ankle Routine Completed Melany Hathaway APRN       Wound 08/29/24 1 Ankle Left;Lateral;Distal (Active)   Date First Assessed/Time First Assessed: 08/29/24 1115    Wound Number (Wound Clinic Only): 1  Location: (c) Ankle  Wound Location Orientation: Left;Lateral;Distal      Assessments 8/29/2024 11:16 AM 10/17/2024 11:18 AM   Wound Image        Site Assessment Moist;Yellow;Pink;Red Dry;Red;Yellow   Closure Not approximated Not approximated   Drainage Amount Small Small   Drainage Description Yellow Serosanguineous   Treatments Cleansed;Saline;Wound  Cleansed;Saline;Vashe;Topical (Barrier/Moisturizer/Ointment);Honey Gel   Dressing Joanne;Tape  Hydrofera transfer;Joanne;Tape   Dressing Changed New Changed   Dressing Status Clean;Dry;Intact Clean;Dry;Intact   Wound Length (cm) 1.4 cm 1.2 cm   Wound Width (cm) 1.3 cm 1.1 cm   Wound Surface Area (cm^2) 1.82 cm^2 1.32 cm^2   Wound Depth (cm) 0.2 cm 0.2 cm   Wound Volume (cm^3) 0.364 cm^3 0.264 cm^3   Wound Healing % -- 27   Margins Well-defined edges Well-defined edges   Non-staged Wound Description Full thickness Full thickness   Angelica-wound Assessment Dry;Intact;Blanchable erythema;Painful Dry;Pink   Wound Granulation Tissue Pink;Red Red   Wound Bed Granulation (%) 100 % 20 %   Wound Bed Epithelium (%) 0 % 0 %   Wound Bed Slough (%) 20 % 80 %   Wound Bed Eschar (%) 0 % 0 %   Wound Bed Fibrin (%) 0 % 0 %   State of Healing Non-healing Non-healing   Wound Odor None None       Active Orders   Date Order Priority Status Authorizing Provider   09/25/24 1429 OP Wound Dressing Routine Active Melany Hathaway APRN     - Cleansing:    Cleanse with normal saline or wound cleanser     - Dressing:    Honey gel     - Dressing:    Dry gauze     - Dressing:    Conforming roll     - Additional Wound Dressing Information:    tape. zinc oxide to periwound     - Frequency:    Change dressing every other day and PRN   09/11/24 1201 OP Wound Dressing Routine Active Melany Hathaway APRN     - Cleansing:    Cleanse with normal saline or wound cleanser     - Cleansing:    Cleanse with Vashe     - Dressing:    Acticoat     - Dressing:    Dry gauze     - Dressing:    Conforming roll     - Dressing:    Paper tape     - Additional Wound Dressing Information:    betamethasone to periwound     - Frequency:    Change dressing weekly   09/05/24 1659 OP Wound Dressing Routine Active Melany Hathaway APRN     - Cleansing:    Cleanse with normal saline or wound cleanser     - Cleansing:    Cleanse with Vashe     - Dressing:    Honey gel     - Dressing:    Dry gauze     - Dressing:    Conforming roll     - Dressing:    Paper tape     -  Frequency:    Change dressing every three days   08/29/24 1421 OP Wound Dressing Routine Active Melany Hathaway APRN     - Cleansing:    Cleanse with normal saline or wound cleanser     - Dressing:    Enluxtra humidifiber     - Dressing:    Conforming roll     - Frequency:    Change dressing two times per week       Inactive Orders   Date Order Priority Status Authorizing Provider   10/17/24 1233 OP Wound Dressing Routine Completed Melany Hathaway APRN     - Cleansing:    Cleanse with normal saline or wound cleanser     - Dressing:    Honey gel     - Dressing:    Hydrofera transfer     - Dressing:    Conforming roll     - Additional Wound Dressing Information:    tape     - Frequency:    Change dressing every other day and PRN (prn)   10/16/24 1852 OP Wound Dressing Routine Completed Melany Hathaway APRN     - Cleansing:    Cleanse with normal saline or wound cleanser     - Cleansing:    Cleanse with Vashe     - Dressing:    Hydrofera transfer     - Dressing:    Dry gauze     - Dressing:    Conforming roll     - Frequency:    Change dressing two times per week (and prn)   10/10/24 1150 Debridement Left;Lateral;Distal Ankle Routine Completed Melany Hathaway APRN   09/19/24 1831 OP Wound Dressing Routine Completed Melany Hathaway APRN     - Cleansing:    Cleanse with normal saline or wound cleanser     - Cleansing:    Cleanse with Vashe     - Dressing:    Honey gel     - Dressing:    Dry gauze     - Dressing:    Kerlix     - Dressing:    Paper tape     - Frequency:    Change dressing every other day and PRN   09/11/24 0958 Debridement Left;Lateral;Distal Ankle Routine Completed Melany Hathaway APRN   09/05/24 1400 Debridement Left;Lateral;Distal Ankle Routine Completed Melany Hathaway APRN   08/29/24 1416 Debridement Left;Lateral;Distal Ankle Routine Completed eMlany Hathaway APRN       Wound 09/19/24 Ankle Right;Medial (Active)   Date First Assessed/Time First Assessed: 09/19/24 0944   Location: Ankle   Wound Location Orientation: Right;Medial      Assessments 9/19/2024  9:28 AM 10/17/2024 11:18 AM   Wound Image       Site Assessment Dry;Yellow;Pink Dry;Pink   Closure Not approximated Not approximated   Drainage Amount None None   Treatments Cleansed;Saline;Vashe;Honey Gel Cleansed;Saline;Vashe;Honey Gel   Dressing Kerlix roll;Tape Hydrofera transfer   Dressing Changed New Changed   Dressing Status Clean;Dry;Intact Clean;Dry;Intact   Wound Length (cm) 0.2 cm 0.1 cm   Wound Width (cm) 0.2 cm 0.1 cm   Wound Surface Area (cm^2) 0.04 cm^2 0.01 cm^2   Wound Depth (cm) 0.1 cm 0.1 cm   Wound Volume (cm^3) 0.004 cm^3 0.001 cm^3   Wound Healing % -- 75   Margins Well-defined edges Attached edges   Non-staged Wound Description Full thickness Full thickness   Angelica-wound Assessment Blanchable erythema;Dry Hyperpigmented;Clean;Dry;Swelling;Edema   Wound Granulation Tissue Pink --   Wound Bed Granulation (%) 5 % 0 %   Wound Bed Epithelium (%) 0 % 100 %   Wound Bed Slough (%) 95 % 0 %   Wound Bed Eschar (%) 0 % 0 %   Wound Bed Fibrin (%) 0 % 0 %   State of Healing Non-healing Epithelialized   Wound Odor None None       Inactive Orders   Date Order Priority Status Authorizing Provider   10/17/24 1233 OP Wound Dressing Routine Completed Melany Hathaway APRN     - Cleansing:    Cleanse with normal saline or wound cleanser     - Dressing:    Honey gel     - Dressing:    Hydrofera transfer     - Dressing:    Conforming roll     - Additional Wound Dressing Information:    tape     - Frequency:    Change dressing every other day and PRN (prn)   10/16/24 1852 OP Wound Dressing Routine Completed Melany Hathaway APRN     - Cleansing:    Cleanse with normal saline or wound cleanser     - Cleansing:    Cleanse with Vashe     - Dressing:    Hydrofera transfer     - Dressing:    Dry gauze     - Dressing:    Conforming roll     - Frequency:    Change dressing two times per week (and prn)   09/19/24 1831 OP Wound Dressing Routine Completed  Melany Hathaway, STEPHANIE     - Cleansing:    Cleanse with normal saline or wound cleanser     - Cleansing:    Cleanse with Vashe     - Dressing:    Honey gel     - Dressing:    Dry gauze     - Dressing:    Kerlix     - Dressing:    Paper tape     - Frequency:    Change dressing every other day and PRN      Lower Extremity Measurements   Calf Ankle Foot Heel to Knee Knee to Thigh   Right                                Left Left Calf from:: Heel  Calf Left cm:: 35.4 Left Ankle from:: Heel  Left Ankle cm:: 22    Left Foot from:: Great toe  Left Foot cm:: 23.8              Procedures  ASSESSMENT AND PLAN:      1. Non-pressure chronic ulcer of left lower leg with fat layer exposed (HCC)  - OP Wound Dressing    2. Non-pressure chronic ulcer right lower leg, limited to breakdown skin (HCC)  - OP Wound Dressing    Chronic full thickness wounds on lateral left lower leg, present for >6 months. Small wound on right medial ankle. HARJINDER 0.5 on left side, 0.7 on right side. Likely contributing to poor wound healing. Vascular surgery eval last week, planning for angiogram 10/30/24. Hindering factors for wound healing include uncontrolled T2DM, HTN.   Wound measurements improved. Wound bed with granulation and slough present. Periwounds clean and dry. No s/sx of infection. Wounds were cleansed with Vashe prior to dressing changes.  Wound Care:  -Cleanse wounds with wound cleanser  -Zinc paste to periwound  -Medi-honey and Hydrofera blue secured with rolled gauze and tape  -Change dressing every 2-3 days and PRN    Continue to keep dressing dry. Wear cast cover if showering.     Continue nutrition for wound healing, encourages reduced carbohydrate intake, increased protein intake, and healthy diet. Recommended increase vitamin intake (either with foods or vitamin supplements), need vitamin A, Bs, C, D and zinc for wound healing.   Continue to work on improving blood sugar control  Risks, benefits, and alternatives of current treatment  plan discussed in detail.  Questions and concerns addressed. Red flags to RTC or ED reviewed.  Patient agrees to plan.      Return in about 1 week (around 10/24/2024) for APN visit x 30 mins.    I spent 24 minutes with the patient. This time included:  preparing to see the patient (eg, review notes and recent diagnostics), seeing the patient, performing a medically appropriate examination and/or evaluation, counseling and educating the patient, and documenting in the record.    NOTE TO PATIENT: The 21st Century Cures Act makes clinical notes like these available to patients in the interest of transparency. Clinical notes are medical documents used by physicians and care providers to communicate with each other. These documents include medical language and terminology, abbreviations, and treatment information that may sound technical and at times possibly unfamiliar. In addition, at times, the verbiage may appear blunt or direct. These documents are one tool providers use to communicate relevant information and clinical opinions of the care providers in a way that allows common understanding of the clinical context.

## 2024-10-21 ENCOUNTER — TELEPHONE (OUTPATIENT)
Dept: WOUND CARE | Facility: HOSPITAL | Age: 74
End: 2024-10-21

## 2024-10-23 ENCOUNTER — NURSE ONLY (OUTPATIENT)
Dept: LAB | Facility: HOSPITAL | Age: 74
End: 2024-10-23
Attending: SURGERY
Payer: MEDICARE

## 2024-10-23 DIAGNOSIS — Z01.818 PRE-OP TESTING: ICD-10-CM

## 2024-10-23 LAB
ANION GAP SERPL CALC-SCNC: 2 MMOL/L (ref 0–18)
BUN BLD-MCNC: 20 MG/DL (ref 9–23)
BUN/CREAT SERPL: 16.1 (ref 10–20)
CALCIUM BLD-MCNC: 9.8 MG/DL (ref 8.7–10.4)
CHLORIDE SERPL-SCNC: 107 MMOL/L (ref 98–112)
CO2 SERPL-SCNC: 31 MMOL/L (ref 21–32)
CREAT BLD-MCNC: 1.24 MG/DL
DEPRECATED RDW RBC AUTO: 45.5 FL (ref 35.1–46.3)
EGFRCR SERPLBLD CKD-EPI 2021: 61 ML/MIN/1.73M2 (ref 60–?)
ERYTHROCYTE [DISTWIDTH] IN BLOOD BY AUTOMATED COUNT: 13.9 % (ref 11–15)
FASTING STATUS PATIENT QL REPORTED: YES
GLUCOSE BLD-MCNC: 188 MG/DL (ref 70–99)
HCT VFR BLD AUTO: 39.6 %
HGB BLD-MCNC: 13.4 G/DL
MCH RBC QN AUTO: 30.5 PG (ref 26–34)
MCHC RBC AUTO-ENTMCNC: 33.8 G/DL (ref 31–37)
MCV RBC AUTO: 90 FL
OSMOLALITY SERPL CALC.SUM OF ELEC: 298 MOSM/KG (ref 275–295)
PLATELET # BLD AUTO: 128 10(3)UL (ref 150–450)
POTASSIUM SERPL-SCNC: 4.4 MMOL/L (ref 3.5–5.1)
RBC # BLD AUTO: 4.4 X10(6)UL
SODIUM SERPL-SCNC: 140 MMOL/L (ref 136–145)
WBC # BLD AUTO: 8.2 X10(3) UL (ref 4–11)

## 2024-10-23 PROCEDURE — 80048 BASIC METABOLIC PNL TOTAL CA: CPT

## 2024-10-23 PROCEDURE — 85027 COMPLETE CBC AUTOMATED: CPT

## 2024-10-23 PROCEDURE — 36415 COLL VENOUS BLD VENIPUNCTURE: CPT

## 2024-10-24 ENCOUNTER — APPOINTMENT (OUTPATIENT)
Dept: WOUND CARE | Facility: HOSPITAL | Age: 74
End: 2024-10-24
Attending: NURSE PRACTITIONER
Payer: MEDICAID

## 2024-10-30 ENCOUNTER — HOSPITAL ENCOUNTER (OUTPATIENT)
Dept: INTERVENTIONAL RADIOLOGY/VASCULAR | Facility: HOSPITAL | Age: 74
Discharge: HOME | End: 2024-10-30
Attending: SURGERY | Admitting: SURGERY
Payer: MEDICARE

## 2024-10-30 VITALS
DIASTOLIC BLOOD PRESSURE: 72 MMHG | TEMPERATURE: 97 F | SYSTOLIC BLOOD PRESSURE: 127 MMHG | HEART RATE: 73 BPM | BODY MASS INDEX: 28.25 KG/M2 | OXYGEN SATURATION: 94 % | RESPIRATION RATE: 19 BRPM | HEIGHT: 67 IN | WEIGHT: 180 LBS

## 2024-10-30 DIAGNOSIS — Z01.818 PRE-OP TESTING: Primary | ICD-10-CM

## 2024-10-30 DIAGNOSIS — I70.299 ATHEROSCLEROSIS OF ARTERY OF EXTREMITY WITH ULCERATION (HCC): ICD-10-CM

## 2024-10-30 DIAGNOSIS — L97.909 ATHEROSCLEROSIS OF ARTERY OF EXTREMITY WITH ULCERATION (HCC): ICD-10-CM

## 2024-10-30 LAB — GLUCOSE BLDC GLUCOMTR-MCNC: 147 MG/DL (ref 70–99)

## 2024-10-30 PROCEDURE — 82962 GLUCOSE BLOOD TEST: CPT

## 2024-10-30 PROCEDURE — B41G1ZZ FLUOROSCOPY OF LEFT LOWER EXTREMITY ARTERIES USING LOW OSMOLAR CONTRAST: ICD-10-PCS | Performed by: SURGERY

## 2024-10-30 PROCEDURE — 36200 PLACE CATHETER IN AORTA: CPT | Performed by: SURGERY

## 2024-10-30 PROCEDURE — 36247 INS CATH ABD/L-EXT ART 3RD: CPT | Performed by: SURGERY

## 2024-10-30 PROCEDURE — 99153 MOD SED SAME PHYS/QHP EA: CPT | Performed by: SURGERY

## 2024-10-30 PROCEDURE — 75710 ARTERY X-RAYS ARM/LEG: CPT | Performed by: SURGERY

## 2024-10-30 PROCEDURE — 75716 ARTERY X-RAYS ARMS/LEGS: CPT | Performed by: SURGERY

## 2024-10-30 PROCEDURE — B41F1ZZ FLUOROSCOPY OF RIGHT LOWER EXTREMITY ARTERIES USING LOW OSMOLAR CONTRAST: ICD-10-PCS | Performed by: SURGERY

## 2024-10-30 PROCEDURE — 99152 MOD SED SAME PHYS/QHP 5/>YRS: CPT | Performed by: SURGERY

## 2024-10-30 PROCEDURE — 75625 CONTRAST EXAM ABDOMINL AORTA: CPT | Performed by: SURGERY

## 2024-10-30 RX ORDER — SODIUM CHLORIDE 9 MG/ML
INJECTION, SOLUTION INTRAVENOUS CONTINUOUS
Status: DISCONTINUED | OUTPATIENT
Start: 2024-10-30 | End: 2024-10-30

## 2024-10-30 RX ORDER — IOPAMIDOL 612 MG/ML
100 INJECTION, SOLUTION INTRAVASCULAR
Status: COMPLETED | OUTPATIENT
Start: 2024-10-30 | End: 2024-10-30

## 2024-10-30 RX ORDER — LIDOCAINE HYDROCHLORIDE 20 MG/ML
INJECTION, SOLUTION INFILTRATION; PERINEURAL
Status: COMPLETED
Start: 2024-10-30 | End: 2024-10-30

## 2024-10-30 RX ORDER — HEPARIN SODIUM 1000 [USP'U]/ML
INJECTION, SOLUTION INTRAVENOUS; SUBCUTANEOUS
Status: COMPLETED
Start: 2024-10-30 | End: 2024-10-30

## 2024-10-30 RX ORDER — SODIUM CHLORIDE 0.9 % (FLUSH) 0.9 %
10 SYRINGE (ML) INJECTION AS NEEDED
Status: DISCONTINUED | OUTPATIENT
Start: 2024-10-30 | End: 2024-10-30

## 2024-10-30 RX ORDER — MIDAZOLAM HYDROCHLORIDE 1 MG/ML
INJECTION INTRAMUSCULAR; INTRAVENOUS
Status: COMPLETED
Start: 2024-10-30 | End: 2024-10-30

## 2024-10-30 RX ADMIN — IOPAMIDOL 50 ML: 612 INJECTION, SOLUTION INTRAVASCULAR at 11:01:00

## 2024-10-30 RX ADMIN — SODIUM CHLORIDE: 9 INJECTION, SOLUTION INTRAVENOUS at 11:15:00

## 2024-10-30 RX ADMIN — SODIUM CHLORIDE: 9 INJECTION, SOLUTION INTRAVENOUS at 08:30:00

## 2024-10-30 NOTE — OPERATIVE REPORT
[facility]    PATIENTS NAME: Guillermo White  ATTENDING PHYSICIAN: Najjar, Samer F, MD  OPERATING PHYSICIAN: Samer F Najjar, MD  CSN: [id number]     LOCATION:  CATH LAB  MRN: [id number]    YOB: 1950  ADMISSION DATE: 10/30/2024  OPERATION DATE: 10/30/2024                CATH LAB PROCEDURE REPORT       PRE-OPERATIVE DIAGNOSIS:  left lower extremity atherosclerosis with ulceration     POST-OPERATIVE DIAGNOSIS: Same as above.    PROCEDURE PERFORMED:   Diagnostic angiogram, third order, of the left lower extremity    ANESTHESIA: Moderate conscious sedation using Versed and Fentanyl was provided.  The RN monitored the oxygen, heart rate and blood pressure under my direct supervision during the course of the procedure.    SURGEON: Samer F Najjar, MD    SEDATION TIME: 45 min    SEDATION MEDICATION:   Versed: 3 mg  Fentanyl: 150 mcg    CONTRAST AMOUNT: 50 ml    EBL: 10 ml    COMPLICATIONS: None    SUMMARY OF CASE: Very severe iliac tortuosity. Severe tibial disease with patency of the distal anterior tibial artery and the dorsalis pedis artery. The peroneal artery was patent proximally but occluded distally. The posterior tibial artery is occluded at its origin. The patient will require an above-knee to dorsalis pedis artery bypass.     INDICATIONS: The patient is a 74 year old male with left lower extremity atherosclerosis with ulceration. He was felt to be an acceptable candidate for endovascular revascularization. We discussed the benefits of the angiogram and the risks of acute thrombosis, distal embolization, nerve injury, restenosis, dissection or poorly controlled bleeding (which may be manifested as free hemorrhage or contained hematoma in the femoral or retroperitoneal areas), pseudoaneurysm, or arteriovenous fistula, infection, renal function worsening and need for open surgery among other complications. Hemorrhage and hematoma are usually evident within 12 hours after the procedure while  other complications, such as pseudoaneurysm and arteriovenous fistula, may not become apparent for days to weeks afterward.  Patient understood and all questions were answered.    DESCRIPTION OF PROCEDURE:  The patient was brought to the catheterization lab and laid supine on the table.  After induction of sedation, the groin areas were prepped and draped in the usual surgical sterile fashion.  A micropuncture needle was then used to access the right common femoral artery after the overlying skin area was infiltrated with 1% lidocaine solution.  A micropuncture wire was then advanced and followed by placement of a 4-Nigerian micro sheath which was then exchanged to a 5-Nigerian sheath over a Glidewire Advantage wire.  An IM catheter was then advanced into the distal aorta where a quick distal aortic angiogram was performed.  This revealed patency of the aorta, both common iliac, internal and external iliac arteries.  There was severe tortuosity present but no stenosis.  The IM catheter was then maneuvered down into the left superficial femoral artery area, and an angiogram was performed in that location and this revealed patency of the common femoral, profunda, and superficial femoral arteries.   The popliteal artery was occluded at the P2 segment.  Then, imaging of the tibial vessels revealed severe tibial disease with patency of the distal anterior tibial artery and the dorsalis pedis artery. The peroneal artery was patent proximally but occluded distally. The posterior tibial artery is occluded at its origin. . Therefore, no endovascular intervention was felt to be possible and the patient will require an above-knee to dorsalis pedis artery bypass. Therefore, the sheath was pulled and pressure was held until hemostasis was achieved.  There were no complications.

## 2024-10-30 NOTE — IVS NOTE
DISCHARGE NOTE     Pt is able to sit up and ambulate without difficulty.   Pt voided and tolerated fluids and food.   Procedural site remains dry and intact with good circulation, motion, and sensation.   No signs and symptoms of bleeding/hematoma noted during recovery or after ambulation.   IV access removed.  Instruction provided, patient/family verbalizes understanding.   Dr. Najjar spoke with patient/family post procedure.     Pt discharge via wheelchair to [facility]     Follow up Appointment: patient to schedule with Dr. Najjar    New Prescription: none

## 2024-10-30 NOTE — DISCHARGE INSTRUCTIONS
HOME CARE INSTRUCTIONS FOLLOWING PERIPHERAL ANGIOGRAPHY, ANGIOPLASTY (PTCA/PTA) OR INSERTION OF STENT IN THE PERIPHERAL ARTERIES      Activity     DO NOT drive after the procedure.  You may resume driving late the following day according to the nurse or physician's instructions  Plan on resting and relaxing tonight and tomorrow  Resume your normal activity after 48 hours, or as instructed by your physician  Do not lift anything over 10 pounds for 1 WEEK  Avoid repeated stair use and excessive walking for the next 24 hours.  Avoid repetitive squatting/bending exercises/motions for 1 week.   Avoid drinking alcohol for the next 24 hours      What is Normal?    A small lump at the procedure site associated with mild tenderness when touched  The procedure site may be bruised or discolored  There may be a small amount of drainage on the bandage    Special Instructions    Drink plenty of fluids during the next 24 hours to \"flush\" the contrast from your system  Keep the bandage clean and dry   After 24 hours, remove the bandage   You may shower after removing the bandage, and wash the procedure site gently with soap and water  Do NOT apply any powders, ointments or creams to the site for 1 week.   DO NOT submerge the procedure site for 1 WEEK (no bath tubs or pools)  If you choose to wear a bandage for a few days, make sure it remains clean and dry and that it is changed daily    Additional Instructions:  Do not take glucophage/metformin containing products for at least 48 hours after procedure, unless otherwise directed by your physician.    When you should NOTIFY YOUR PHYSICIAN    Bleeding can occur at the procedure site - both on the outside of the skin and/or beneath the surface of the skin  Swelling or a large lump at the procedure site can occur, which may be accompanied by moderate to severe pain    If either of the above occurs, lie down flat.    Have someone apply pressure to the procedure site with both hands, as  Kann[facility] Rosales will call you to schedule cystoscopy, bilateral ureteroscopy, possible laser lithotripsy, bilateral ureteral stent placement. You will need a urine culture done 2 weeks prior to the procedure so if there is an infection we can treat it before that. instructed by the nurse.    Hold pressure for 20 minutes and the bleeding should stop.    Notify your physician of the occurrence  If the bleeding does not stop, call 911 and continue to apply pressure    If you experience signs of a fever, temperature > 101°, chills, infection (redness, swelling, thick yellow drainage, or a foul odor from the procedure site)  If you notice any numbness, tingling, or loss of feeling to your leg or foot or groin access    Other    You may resume your present diet, unless otherwise specified by your physician.  You may resume all of your medications as prescribed, unless otherwise directed by your physician.  A list of your medications was provided to you at discharge.  Continue the walking program initiated in the hospital and progress your walking as directed.  Or, gradually resume your previous aerobic exercise schedule as tolerated.  Please call your physician’s office for a follow-up appointment.

## 2024-10-30 NOTE — H&P
Samer F. Najjar, MD  Vascular Surgery  [facility]                     VASCULAR SURGERY   CLINIC CONSULT NOTE           Name: Guillermo White   :   1950  [id number]      REFERRING PHYSICIAN:  No ref. provider found  PRIMARY CARE PHYSICIAN:  PHYSICIAN NONSTAFF     HISTORY OF PRESENT ILLNESS:   Patient is a 74 year old male who has been referred regarding management of a left lateral ankle wounds. He has been treated for the past two months at the [place] [facility] wound care center. They are Surinamese speaking, language line was used for translation. Reports wounds have been present for at least 6 months. He went to [facility] for evaluation of the wounds. He was treated for wound infection with a course of Augmentin with improvement. Pt reports he currently has no pain at the wound site, but will experience sharp pain at the wound site at night time. He was given Gabapentin by [facility], but this has not helped. He denies known history of stroke, blood clots, CHF, PAD, Afib. Reports he takes Plavix for poor circulation. Reports taking Insulin as prescribed.  He underwent arterial testing that revealed moderate to severe decrease in resting ankle-brachial indices, with an HARJINDER of 0.5 on the symptomatic left side.     PAST MEDICAL HISTORY:    Past Medical History       Past Medical History:    Aspiration pneumonia (HCC)     3/2023    Diabetes ([facility])    High blood pressure    High cholesterol    Lacunar infarction (HCC)    Osteomyelitis of lumbar spine (HCC)         TIA (transient ischemic attack)    Vestibular schwannoma (HCC)            PAST SURGICAL HISTORY:   Past Surgical History         Past Surgical History:   Procedure Laterality Date    Back surgery        Brain surgery         vestibular schwannoma @ [facility] w/left facial paralysis    Cataract        Cholecystectomy                MEDICATIONS:     Medications - Current      Current Outpatient Medications:     amLODIPine 5 MG Oral Tab, Take 1  tablet (5 mg total) by mouth daily., Disp: , Rfl:     clopidogrel 75 MG Oral Tab, Take 1 tablet (75 mg total) by mouth daily., Disp: , Rfl:     ergocalciferol 1.25 MG (46805 UT) Oral Cap, Take 1 capsule (50,000 Units total) by mouth once a week., Disp: , Rfl:     gabapentin 100 MG Oral Cap, Take 1 capsule (100 mg total) by mouth 3 (three) times daily., Disp: , Rfl:     LANTUS SOLOSTAR 100 UNIT/ML Subcutaneous Solution Pen-injector, Inject 66 Units into the skin daily., Disp: , Rfl:     Insulin Lispro, 1 Unit Dial, 100 UNIT/ML Subcutaneous Solution Pen-injector, Inject 8 Units as directed 3 (three) times daily., Disp: , Rfl:     BD PEN NEEDLE SHANNON 2ND GEN 32G X 4 MM Does not apply Misc, 1 each 4 (four) times daily., Disp: , Rfl:     losartan-hydroCHLOROthiazide 100-25 MG Oral Tab, Take 1 tablet by mouth daily., Disp: , Rfl:     rosuvastatin 40 MG Oral Tab, Take 1 tablet (40 mg total) by mouth nightly., Disp: , Rfl:     metFORMIN HCl ER, OSM, 500 MG (OSM) Oral Tablet 24 Hr, , Disp: , Rfl:         ALLERGIES:    He has No Known Allergies.     SOCIAL HISTORY:    Patient  reports that he has never smoked. He has never used smokeless tobacco.     FAMILY HISTORY:    Patient's family history is not on file.     ROS:     A 12 point review of systems with pertinent positives and negatives listed in the HPI.     EXAM:     Resp 18   Ht 5' 7\" (1.702 m)   Wt 180 lb (81.6 kg)   BMI 28.19 kg/m²   GENERAL: alert and orientated X 3, well developed, well nourished, in no apparent distress  PSYCH: normal mood and affect  HEENT: ears and throat are clear  NECK: supple, no lymphadenopathy, thyroid wnl  CAROTID: no bruits  RESPIRATORY: no rales, rhonchi, or wheezes B  CARDIO: RRR without murmur, no murmur, no gallop   ABDOMEN: soft, non-tender with no palpable aneurysm or masses  BACK: normal, no tenderness  SKIN: no rashes, warm and dry  EXTREMITIES: no tenderness  NEURO: no sensory or motor deficits  VASCULAR:        Femoral Popliteal  DP PT Peroneal   Right 2+       monophasic signal monophasic signal     Left 2+       monophasic signal monophasic signal        Left supralateral malleolar ulcer measuring 1.4 cm     LABS:   No results found for: \"EAG\", \"A1C\"   No results found for: \"GLU\", \"BUN\", \"CREATSERUM\", \"BUNCREA\", \"ANIONGAP\", \"GFRAA\", \"GFRNAA\", \"CA\", \"NA\", \"K\", \"CL\", \"CO2\", \"OSMOCALC\"   No results found for: \"WBC\", \"RBC\", \"HGB\", \"HCT\", \"MCV\", \"MCH\", \"MCHC\", \"RDW\", \"PLT\", \"MPV\"      No results found for: \"HGB\", \"CREATSERUM\"        ASSESSMENT/PLAN:     I have reviewed the patient's prior documentation and studies from [employer] and from [employer].     Diagnoses and all orders for this visit:     Atherosclerosis of artery of extremity with ulceration (HCC)      The patient has a non-healing LLE wound that appears to be influenced by poor perfusion. I have recommended a LLE angiogram with intervention. He was felt to be an acceptable candidate for endovascular revascularization. We discussed the benefits of the angiogram and the risks of acute thrombosis, distal embolization, nerve injury, restenosis, dissection or poorly controlled bleeding (which may be manifested as free hemorrhage or contained hematoma in the femoral or retroperitoneal areas), pseudoaneurysm, or arteriovenous fistula, infection, renal function worsening and need for open surgery among other complications. Hemorrhage and hematoma are usually evident within 12 hours after the procedure while other complications, such as pseudoaneurysm and arteriovenous fistula, may not become apparent for days to weeks afterward.  Patient understood and all questions were answered.         The patient indicated an understanding of these issues and agreed to the plan and all questions were answered during the clinic visit.       Thank you for allowing me to participate in your patient's care.   Please do not hesitate to contact me with any questions.     Sincerely,  Samer F. Najjar, MD

## 2024-10-31 ENCOUNTER — APPOINTMENT (OUTPATIENT)
Dept: WOUND CARE | Facility: HOSPITAL | Age: 74
End: 2024-10-31
Attending: NURSE PRACTITIONER
Payer: MEDICAID

## 2024-11-04 ENCOUNTER — TELEPHONE (OUTPATIENT)
Facility: CLINIC | Age: 74
End: 2024-11-04

## 2024-11-05 ENCOUNTER — TELEPHONE (OUTPATIENT)
Dept: WOUND CARE | Facility: HOSPITAL | Age: 74
End: 2024-11-05

## 2024-11-05 NOTE — TELEPHONE ENCOUNTER
Per patient's daughter Dorita, Can you recommend a cardiologist at [place] in [facility] for a heart consult evaluation? Please advise

## 2024-11-06 NOTE — TELEPHONE ENCOUNTER
I informed patient's daughter per Dr. Najjar he recommends Dr. Med Erwin cardiologist from [facility] [phone].

## 2024-11-13 NOTE — PROGRESS NOTES
Chief Complaint   Patient presents with    Wound Recheck     Here for left lateral ankle and right medial ankle wound care follow up, bg today is 200     HPI:   11/14/24: F/u visit for lower leg wounds. Underwent diagnostic angiogram 10/30/24. Saw Dr. Najjar this morning. His daughter reports he is planning for bypass surgery as soon as possible. Pt's daughter said the wounds have been improving. She is dressing the wound with medi-honey and hydrofera blue once a week.        10/17/24: F/u visit lower leg wounds. Scheduled for angiogram 10/30/24. Reports he does not currently have any pain.     10/10/24: Here today with daughter for follow up on left lateral ankle wounds. Saw Dr. Najjar prior to visit today. Planning for angiogram. Denies changes in health since last visit.    9/25/24: F/u left lateral ankle wounds and right ankle wound. Accompanied by his daughter, language line used for Swiss translation. C/o pain at night time, no pain with walking. Itching around ankle as well. Taking Tylenol 550 mg 1 tablet without relief. Has been changing medi-honey dressing as recommended.    9/19/24: F/u left lateral ankle wounds. New wound on right medial ankle. Here with his daughters. Language line used for Swiss translation. Pt reports he is doing well. Pain at wound sites is less than at previous visits.     9/11/24: F/u L lateral ankle wounds. Language line translating for the visit. He has been applying medi-honey and gauze to the wounds for the past week. Tolerating well. Daughter reports old wound site on left great toe has intermittent clear drainage. Pt reports pain is well controlled. C/o intermittent itching around left ankle. Daughter is asking to have motorized scooter ordered for patient out of concern for tiredness after walking short distances, imbalance and mild SOB. No pain at wound site when walking    9/5/24: F/u visit on left lateral ankle wounds. Language line translated for visit. Pt tolerated  dressing well, but felt dressing was tight. He says his pain varies to little pain up to 7/10. No change in health or new concerns.    8/29/24: 74 yr old male here today with his daughter for evaluation of left lateral ankle wounds. They are Czech speaking, language line was used for translation. Reports wounds have been present for at least 6 months. He went to [facility] ED 8/6/24 for evaluation of the wounds. He was treated for wound infection with a course of Augmentin 875mg. Pt says the redness around the wound has improved since then. He was previously receiving treatment for the wound in [place]. He has tried medi-honey and what appears to be the equivalent to Santyl. Reports both treatments caused increased pain at the wound site. Since he was at the ED, his daughter has been cleaning with an OTC wound cleanser and wounds have been left open to air. He has been s howering regularly. Additionally, he has wound on his left great toe that they think has healed, occasionally she notices a drop of drainage. Pt reports he currently has no pain at the wound site, but will experience sharp pain at the wound site at night time. He was given Gabapentin by [facility], but this has not helped. He denies known history of blood clots, CHF, PAD, Afib. Reports he takes Plavix for poor circulation. Reports taking Insulin as prescribed.      Medical Problems:  T2DM (uncontrolled)  HTN  Hyperlipidemia    Past Medical History:    Aspiration pneumonia ([facility])    3/2023    Diabetes ([facility])    High blood pressure    High cholesterol    Lacunar infarction ([facility])    Osteomyelitis of lumbar spine ([facility])    2010    TIA (transient ischemic attack)    Vestibular schwannoma ([facility])     Pertinent Labs:     Ref Range & Units 8/5/24 2200   WBC  3.5 - 10.5 K/UL 5.1   RBC  4.20 - 5.80 M/UL 4.65   HGB  13.0 - 17.5 GM/DL 13.5   HCT  38.0 - 54.0 % 40.9     BUN  7 - 22 MG/DL 25 High    CREATININE  0.6 - 1.4 MG/DL 1.07   GLUCOSE  70 - 100 MG/ High       ESTIMATED GFR  >59 ML/MIN/1.73M2 73     SED RATE  0 - 25 MM 19     C REACTIVE PROTEIN  <8.1 MG/L 2.0     HARJINDER Result 9/11/24:   PROCEDURE: US ANKLE BRACHIAL INDEX (CPT=93922)    COMPARISON: None.   INDICATIONS: E11.59 Type 2 diabetes mellitus with other circulatory complications (HCC) L97.922 Non-pressure chronic ulcer of left lower leg with fat layer exposed (HCC)      TECHNIQUE: Segmental pressures and Doppler wave forms were obtained in the ankles and feet.       SEGMENTAL BP   PRESSURE INDEX      RIGHT   BRACHIAL: 150 mmHg   POSTERIOR TIBIAL: 87 mmHg 0.6   DORSALIS PEDIS: 100 mmHg 0.7       LEFT   BRACHIAL: 143 mmHg   POSTERIOR TIBIAL: 80 mmHg 0.5   DORSALIS PEDIS: 76 mmHg 0.5        Impression   CONCLUSION:  1. Moderately moderate to severe decrease in resting ankle-brachial indices, with an HARJINDER of 0.5 on the symptomatic left side.       Outside Imaging:  EXAM: AP and lateral views of the left tibia and fibula   DATE: 8/5/2024 11:49 PM   CLINICAL INDICATION: ulceration two months, treated in [place], seems worse to   family.   COMPARISON: None.   FINDINGS:   Bones:There is no acute fracture or dislocation.   Joints: Joint spaces are intact without degenerative proliferation.   Soft Tissues: Soft tissue ulceration over the lateral aspect of the distal lower   extremity.   IMPRESSION:   No acute fracture or dislocation of the left tibia or fibula.  No osseous   erosion.  Soft tissue ulceration distally.     EXAM: Non-Standing AP, lateral and oblique views of the left foot   DATE: 8/5/2024 11:49 PM   CLINICAL INDICATION: ulcerations left foot at base of great toe and left lower   leg distally/laterally.    COMPARISON: None.   FINDINGS:   Bones: There is no acute fracture or dislocation.    Joints: Joint spaces show mild degenerative proliferation.   Soft Tissues: Focal soft tissue swelling at the medial aspect of the hallux   distal phalanx.     IMPRESSION:   No acute fracture-dislocation left foot.  No osseous  erosion.  Soft tissue   swelling.       Current Outpatient Medications:     metFORMIN HCl ER, OSM, 500 MG (OSM) Oral Tablet 24 Hr, , Disp: , Rfl:     amLODIPine 5 MG Oral Tab, Take 1 tablet (5 mg total) by mouth daily., Disp: , Rfl:     clopidogrel 75 MG Oral Tab, Take 1 tablet (75 mg total) by mouth daily., Disp: , Rfl:     ergocalciferol 1.25 MG (74584 UT) Oral Cap, Take 1 capsule (50,000 Units total) by mouth once a week., Disp: , Rfl:     gabapentin 100 MG Oral Cap, Take 1 capsule (100 mg total) by mouth 3 (three) times daily., Disp: , Rfl:     LANTUS SOLOSTAR 100 UNIT/ML Subcutaneous Solution Pen-injector, Inject 66 Units into the skin daily., Disp: , Rfl:     Insulin Lispro, 1 Unit Dial, 100 UNIT/ML Subcutaneous Solution Pen-injector, Inject 8 Units as directed 3 (three) times daily., Disp: , Rfl:     BD PEN NEEDLE SHANNON 2ND GEN 32G X 4 MM Does not apply Misc, 1 each 4 (four) times daily., Disp: , Rfl:     losartan-hydroCHLOROthiazide 100-25 MG Oral Tab, Take 1 tablet by mouth daily., Disp: , Rfl:     rosuvastatin 40 MG Oral Tab, Take 1 tablet (40 mg total) by mouth nightly., Disp: , Rfl:     Allergies:  No Known Allergies     REVIEW OF SYSTEMS:   Review of Systems   Constitutional:  Negative for activity change, appetite change, chills, fatigue and fever.   Respiratory:  Negative for shortness of breath.    Cardiovascular:  Negative for chest pain.   Gastrointestinal:  Negative for abdominal pain.   Musculoskeletal:  Positive for gait problem (ambulates with cane).   Skin:  Positive for wound. Negative for rash.   Neurological:  Negative for dizziness, numbness and headaches.   Psychiatric/Behavioral:          Daughter reports he sometimes gets depressed by non-healing wound     PHYSICAL EXAM:   /78   Pulse 91   Temp 99.2 °F (37.3 °C)   Wt 183 lb 3.2 oz   SpO2 94%   BMI 28.69 kg/m²    Estimated body mass index is 28.69 kg/m² as calculated from the following:    Height as of an earlier encounter on  24: 67\".    Weight as of this encounter: 183 lb 3.2 oz.   Vital signs reviewed.Appears stated age, well groomed.    Physical Exam  Constitutional:       General: He is not in acute distress.     Appearance: Normal appearance. He is not ill-appearing.   HENT:      Head: Normocephalic and atraumatic.   Cardiovascular:      Rate and Rhythm: Normal rate.      Comments: 24 handheld doppler findings of biphasic wave sounds at right DP and PT pulses, monophasic wave sounds left DP and PT.    Pulmonary:      Effort: Pulmonary effort is normal.   Musculoskeletal:      Right lower leg: Edema present.      Left lower le+ Pitting Edema present.   Skin:     General: Skin is warm and dry.      Capillary Refill: Capillary refill takes 2 to 3 seconds.      Findings: Wound (left lateral ankle, left 1st toe R ankle; see wound care flowsheet) present. No rash.   Neurological:      General: No focal deficit present.      Mental Status: He is alert and oriented to person, place, and time.   Psychiatric:         Mood and Affect: Mood normal.         Behavior: Behavior normal.       Wound 24 3 Ankle Left;Lateral;Proximal (Active)   Date First Assessed/Time First Assessed: 24 1106    Wound Number (Wound Clinic Only): 3  Location: Ankle  Wound Location Orientation: Left;Lateral;Proximal      Assessments 2024 11:16 AM 2024  9:50 AM   Wound Image        Site Assessment Moist;Yellow Moist;Pink;Red;Yellow   Closure Not approximated Not approximated   Drainage Amount Scant Moderate   Drainage Description Yellow Serosanguineous;Yellow   Treatments Cleansed;Wound ;Saline Cleansed;Saline;Vashe;Honey Gel   Dressing Joanne;Tape Hydrofera transfer;Joanne;Tape   Dressing Changed New Changed   Dressing Status Dry;Intact;Clean Dry;Intact;Clean   Wound Length (cm) 1.9 cm 1.5 cm   Wound Width (cm) 1.6 cm 1.1 cm   Wound Surface Area (cm^2) 3.04 cm^2 1.65 cm^2   Wound Depth (cm) 0.1 cm 0.1 cm   Wound Volume (cm^3)  0.304 cm^3 0.165 cm^3   Wound Healing % -- 46   Margins Attached edges Well-defined edges   Non-staged Wound Description Full thickness Full thickness   Angelica-wound Assessment Clean;Dry;Blanchable erythema;Painful Dry;Pink   Wound Granulation Tissue -- Red;Pink   Wound Bed Granulation (%) 10 % 20 %   Wound Bed Epithelium (%) 0 % 20 %   Wound Bed Slough (%) 90 % 60 %   Wound Bed Eschar (%) 0 % 0 %   Wound Bed Fibrin (%) 0 % 0 %   State of Healing Non-healing Early/partial granulation   Wound Odor None None       Active Orders   Date Order Priority Status Authorizing Provider   11/14/24 1033 OP Wound Dressing Routine Active Melany Hathaway APRN     - Cleansing:    Cleanse with normal saline or wound cleanser     - Dressing:    Honey gel     - Dressing:    Hydrofera transfer     - Dressing:    Dry gauze     - Additional Wound Dressing Information:    tape     - Frequency:    Change dressing weekly   09/25/24 1429 OP Wound Dressing Routine Active Melany Hathaway APRN     - Cleansing:    Cleanse with normal saline or wound cleanser     - Dressing:    Honey gel     - Dressing:    Dry gauze     - Dressing:    Conforming roll     - Additional Wound Dressing Information:    tape. zinc oxide to periwound     - Frequency:    Change dressing every other day and PRN   09/11/24 1201 OP Wound Dressing Routine Active Melany Hathaway APRN     - Cleansing:    Cleanse with normal saline or wound cleanser     - Cleansing:    Cleanse with Vashe     - Dressing:    Acticoat     - Dressing:    Dry gauze     - Dressing:    Conforming roll     - Dressing:    Paper tape     - Additional Wound Dressing Information:    betamethasone to periwound     - Frequency:    Change dressing weekly   09/05/24 1659 OP Wound Dressing Routine Active Melany Hathaway APRN     - Cleansing:    Cleanse with normal saline or wound cleanser     - Cleansing:    Cleanse with Vashe     - Dressing:    Honey gel     - Dressing:    Dry gauze     - Dressing:     Conforming roll     - Dressing:    Paper tape     - Frequency:    Change dressing every three days   08/29/24 1421 OP Wound Dressing Routine Active Melany Hathaway APRN     - Cleansing:    Cleanse with normal saline or wound cleanser     - Dressing:    Enluxtra humidifiber     - Dressing:    Conforming roll     - Frequency:    Change dressing two times per week       Inactive Orders   Date Order Priority Status Authorizing Provider   10/17/24 1233 OP Wound Dressing Routine Completed Melany Hathaway APRN     - Cleansing:    Cleanse with normal saline or wound cleanser     - Dressing:    Honey gel     - Dressing:    Hydrofera transfer     - Dressing:    Conforming roll     - Additional Wound Dressing Information:    tape     - Frequency:    Change dressing every other day and PRN (prn)   10/16/24 1852 OP Wound Dressing Routine Completed Melany Hatahway APRN     - Cleansing:    Cleanse with normal saline or wound cleanser     - Cleansing:    Cleanse with Vashe     - Dressing:    Hydrofera transfer     - Dressing:    Dry gauze     - Dressing:    Conforming roll     - Frequency:    Change dressing two times per week (and prn)   10/10/24 1138 Debridement Left;Lateral;Proximal Ankle Routine Completed Melany Hathaway APRN   09/19/24 1831 OP Wound Dressing Routine Completed Melany Hathaway APRN     - Cleansing:    Cleanse with normal saline or wound cleanser     - Cleansing:    Cleanse with Vashe     - Dressing:    Honey gel     - Dressing:    Dry gauze     - Dressing:    Kerlix     - Dressing:    Paper tape     - Frequency:    Change dressing every other day and PRN   09/19/24 1021 Debridement Left;Lateral;Proximal Ankle Routine Completed Melany Hathaway APRN   09/11/24 0955 Debridement Left;Lateral;Proximal Ankle Routine Completed Melany Hathaway APRN   09/05/24 1401 Debridement Left;Lateral;Proximal Ankle Routine Completed Melany Hathaway APRN   08/29/24 1415 Debridement Left;Lateral;Proximal Ankle Routine  Completed Melany Hathaway APRN       Wound 08/29/24 1 Ankle Left;Lateral;Distal (Active)   Date First Assessed/Time First Assessed: 08/29/24 1115    Wound Number (Wound Clinic Only): 1  Location: (c) Ankle  Wound Location Orientation: Left;Lateral;Distal      Assessments 8/29/2024 11:16 AM 11/14/2024  9:50 AM   Wound Image        Site Assessment Moist;Yellow;Pink;Red Dry;Red;Yellow   Closure Not approximated Not approximated   Drainage Amount Small Small   Drainage Description Yellow Serosanguineous   Treatments Cleansed;Saline;Wound  Cleansed;Saline;Vashe;Topical (Barrier/Moisturizer/Ointment);Honey Gel   Dressing Joanne;Tape Hydrofera transfer;Joanne;Tape   Dressing Changed New Changed   Dressing Status Clean;Dry;Intact Clean;Dry;Intact   Wound Length (cm) 1.4 cm 0.5 cm   Wound Width (cm) 1.3 cm 0.3 cm   Wound Surface Area (cm^2) 1.82 cm^2 0.15 cm^2   Wound Depth (cm) 0.2 cm 0.1 cm   Wound Volume (cm^3) 0.364 cm^3 0.015 cm^3   Wound Healing % -- 96   Margins Well-defined edges Well-defined edges   Non-staged Wound Description Full thickness Full thickness   Angelica-wound Assessment Dry;Intact;Blanchable erythema;Painful Dry;Pink   Wound Granulation Tissue Pink;Red Pink   Wound Bed Granulation (%) 100 % 40 %   Wound Bed Epithelium (%) 0 % 50 %   Wound Bed Slough (%) 20 % 10 %   Wound Bed Eschar (%) 0 % 0 %   Wound Bed Fibrin (%) 0 % 0 %   State of Healing Non-healing Early/partial granulation   Wound Odor None None       Active Orders   Date Order Priority Status Authorizing Provider   11/14/24 1033 OP Wound Dressing Routine Active Melany Hathaway APRN     - Cleansing:    Cleanse with normal saline or wound cleanser     - Dressing:    Honey gel     - Dressing:    Hydrofera transfer     - Dressing:    Dry gauze     - Additional Wound Dressing Information:    tape     - Frequency:    Change dressing weekly   09/25/24 1429 OP Wound Dressing Routine Active Melany Hathaway APRN     - Cleansing:    Cleanse with  normal saline or wound cleanser     - Dressing:    Honey gel     - Dressing:    Dry gauze     - Dressing:    Conforming roll     - Additional Wound Dressing Information:    tape. zinc oxide to periwound     - Frequency:    Change dressing every other day and PRN   09/11/24 1201 OP Wound Dressing Routine Active Melany Hathaway APRN     - Cleansing:    Cleanse with normal saline or wound cleanser     - Cleansing:    Cleanse with Vashe     - Dressing:    Acticoat     - Dressing:    Dry gauze     - Dressing:    Conforming roll     - Dressing:    Paper tape     - Additional Wound Dressing Information:    betamethasone to periwound     - Frequency:    Change dressing weekly   09/05/24 1659 OP Wound Dressing Routine Active Melany Hathaway APRN     - Cleansing:    Cleanse with normal saline or wound cleanser     - Cleansing:    Cleanse with Vashe     - Dressing:    Honey gel     - Dressing:    Dry gauze     - Dressing:    Conforming roll     - Dressing:    Paper tape     - Frequency:    Change dressing every three days   08/29/24 1421 OP Wound Dressing Routine Active Melany Hathaway APRN     - Cleansing:    Cleanse with normal saline or wound cleanser     - Dressing:    Enluxtra humidifiber     - Dressing:    Conforming roll     - Frequency:    Change dressing two times per week       Inactive Orders   Date Order Priority Status Authorizing Provider   10/17/24 1233 OP Wound Dressing Routine Completed Melany Hathaway APRN     - Cleansing:    Cleanse with normal saline or wound cleanser     - Dressing:    Honey gel     - Dressing:    Hydrofera transfer     - Dressing:    Conforming roll     - Additional Wound Dressing Information:    tape     - Frequency:    Change dressing every other day and PRN (prn)   10/16/24 1852 OP Wound Dressing Routine Completed Melany Hathaway APRN     - Cleansing:    Cleanse with normal saline or wound cleanser     - Cleansing:    Cleanse with Vashe     - Dressing:    Hydrofera transfer      - Dressing:    Dry gauze     - Dressing:    Conforming roll     - Frequency:    Change dressing two times per week (and prn)   10/10/24 1150 Debridement Left;Lateral;Distal Ankle Routine Completed Melany Hathaway APRN   09/19/24 1831 OP Wound Dressing Routine Completed Melany Hathaway APRN     - Cleansing:    Cleanse with normal saline or wound cleanser     - Cleansing:    Cleanse with Vashe     - Dressing:    Honey gel     - Dressing:    Dry gauze     - Dressing:    Kerlix     - Dressing:    Paper tape     - Frequency:    Change dressing every other day and PRN   09/11/24 0958 Debridement Left;Lateral;Distal Ankle Routine Completed Melany Hathaway APRN   09/05/24 1400 Debridement Left;Lateral;Distal Ankle Routine Completed Melany Hathaway APRN   08/29/24 1416 Debridement Left;Lateral;Distal Ankle Routine Completed Melany Hathaway APRN       Wound 09/19/24 Ankle Right;Medial (Active)   Date First Assessed/Time First Assessed: 09/19/24 0944   Location: Ankle  Wound Location Orientation: Right;Medial      Assessments 9/19/2024  9:28 AM 11/14/2024  9:50 AM   Wound Image       Site Assessment Dry;Yellow;Pink Dry;Pink   Closure Not approximated Not approximated   Drainage Amount None None   Drainage Description -- Scabbed   Treatments Cleansed;Saline;Vashe;Honey Gel Cleansed;Saline;Vashe;Honey Gel   Dressing Kerlix roll;Tape Hydrofera transfer   Dressing Changed New Changed   Dressing Status Clean;Dry;Intact Clean;Dry;Intact   Wound Length (cm) 0.2 cm 0.1 cm   Wound Width (cm) 0.2 cm 0.1 cm   Wound Surface Area (cm^2) 0.04 cm^2 0.01 cm^2   Wound Depth (cm) 0.1 cm 0.1 cm   Wound Volume (cm^3) 0.004 cm^3 0.001 cm^3   Wound Healing % -- 75   Margins Well-defined edges Attached edges   Non-staged Wound Description Full thickness Full thickness   Angelica-wound Assessment Blanchable erythema;Dry Hyperpigmented;Clean;Dry;Swelling;Edema   Wound Granulation Tissue Pink --   Wound Bed Granulation (%) 5 % 0 %   Wound Bed  Epithelium (%) 0 % 100 %   Wound Bed Slough (%) 95 % 0 %   Wound Bed Eschar (%) 0 % 0 %   Wound Bed Fibrin (%) 0 % 0 %   State of Healing Non-healing Epithelialized   Wound Odor None None       Active Orders   Date Order Priority Status Authorizing Provider   11/14/24 1033 OP Wound Dressing Routine Active Melany Hathaway APRN     - Cleansing:    Cleanse with normal saline or wound cleanser     - Dressing:    Honey gel     - Dressing:    Hydrofera transfer     - Dressing:    Dry gauze     - Additional Wound Dressing Information:    tape     - Frequency:    Change dressing weekly       Inactive Orders   Date Order Priority Status Authorizing Provider   10/17/24 1233 OP Wound Dressing Routine Completed Melany Hathaway APRN     - Cleansing:    Cleanse with normal saline or wound cleanser     - Dressing:    Honey gel     - Dressing:    Hydrofera transfer     - Dressing:    Conforming roll     - Additional Wound Dressing Information:    tape     - Frequency:    Change dressing every other day and PRN (prn)   10/16/24 1852 OP Wound Dressing Routine Completed Melany Hathaway APRN     - Cleansing:    Cleanse with normal saline or wound cleanser     - Cleansing:    Cleanse with Vashe     - Dressing:    Hydrofera transfer     - Dressing:    Dry gauze     - Dressing:    Conforming roll     - Frequency:    Change dressing two times per week (and prn)   09/19/24 1831 OP Wound Dressing Routine Completed Melany Hathaway APRN     - Cleansing:    Cleanse with normal saline or wound cleanser     - Cleansing:    Cleanse with Vashe     - Dressing:    Honey gel     - Dressing:    Dry gauze     - Dressing:    Kerlix     - Dressing:    Paper tape     - Frequency:    Change dressing every other day and PRN           Procedures  ASSESSMENT AND PLAN:      1. Diabetic ulcer of left lower leg associated with diabetes mellitus due to underlying condition, limited to breakdown of skin (HCC)  - OP Wound Dressing; Standing    Chronic wounds  on lateral left lower leg, present for >6 months. Small wound on right medial ankle. HARJINDER 0.5 on left side, 0.7 on right side. Followed by Dr. 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 Planning for bypass surgery per pt's daughter. Hindering factors for wound healing include uncontrolled T2DM, HTN, atherosclerosis.   Wound measurements continue to improve. Wound bed with granulation and slough present. Periwounds clean and dry. No s/sx of infection. Wounds were cleansed with Vashe prior to dressing changes.  Wound Care:  -Cleanse wounds with wound cleanser  -Medi-honey and Hydrofera blue secured with rolled gauze and tape  -Change dressing 1x/week and PRN    Continue to keep dressing dry. Wear cast cover if showering.     Continue nutrition for wound healing, encourages reduced carbohydrate intake, increased protein intake, and healthy diet. Recommended increase vitamin intake (either with foods or vitamin supplements), need vitamin A, Bs, C, D and zinc for wound healing.   Continue to work on improving blood sugar control  Risks, benefits, and alternatives of current treatment plan discussed in detail.  Questions and concerns addressed. Red flags to RTC or ED reviewed.  Patient agrees to plan.      Return in about 4 weeks (around 12/12/2024) for APN visit x 30 mins.    I spent 23 minutes with the patient. This time included:  preparing to see the patient (eg, review notes and recent diagnostics), seeing the patient, performing a medically appropriate examination and/or evaluation, counseling and educating the patient, and documenting in the record.    NOTE TO PATIENT: The 21st Century Cures Act makes clinical notes like these available to patients in the interest of transparency. Clinical notes are medical documents used by physicians and care providers to communicate with each other. These documents include medical language and terminology,  abbreviations, and treatment information that may sound technical and at times possibly unfamiliar. In addition, at times, the verbiage may appear blunt or direct. These documents are one tool providers use to communicate relevant information and clinical opinions of the care providers in a way that allows common understanding of the clinical context.

## 2024-11-14 ENCOUNTER — OFFICE VISIT (OUTPATIENT)
Dept: WOUND CARE | Facility: HOSPITAL | Age: 74
End: 2024-11-14
Attending: NURSE PRACTITIONER
Payer: MEDICARE

## 2024-11-14 ENCOUNTER — NURSE ONLY (OUTPATIENT)
Facility: CLINIC | Age: 74
End: 2024-11-14

## 2024-11-14 ENCOUNTER — OFFICE VISIT (OUTPATIENT)
Facility: CLINIC | Age: 74
End: 2024-11-14
Payer: MEDICAID

## 2024-11-14 ENCOUNTER — TELEPHONE (OUTPATIENT)
Facility: CLINIC | Age: 74
End: 2024-11-14

## 2024-11-14 VITALS
OXYGEN SATURATION: 94 % | WEIGHT: 183.19 LBS | SYSTOLIC BLOOD PRESSURE: 126 MMHG | DIASTOLIC BLOOD PRESSURE: 78 MMHG | TEMPERATURE: 99 F | BODY MASS INDEX: 29 KG/M2 | HEART RATE: 91 BPM

## 2024-11-14 VITALS — RESPIRATION RATE: 20 BRPM | BODY MASS INDEX: 28.25 KG/M2 | HEIGHT: 67 IN | WEIGHT: 180 LBS

## 2024-11-14 DIAGNOSIS — E08.622 DIABETIC ULCER OF LEFT LOWER LEG ASSOCIATED WITH DIABETES MELLITUS DUE TO UNDERLYING CONDITION, LIMITED TO BREAKDOWN OF SKIN (HCC): Primary | ICD-10-CM

## 2024-11-14 DIAGNOSIS — I70.249: ICD-10-CM

## 2024-11-14 DIAGNOSIS — L97.921 DIABETIC ULCER OF LEFT LOWER LEG ASSOCIATED WITH DIABETES MELLITUS DUE TO UNDERLYING CONDITION, LIMITED TO BREAKDOWN OF SKIN (HCC): Primary | ICD-10-CM

## 2024-11-14 DIAGNOSIS — L97.909 ATHEROSCLEROSIS OF ARTERY OF EXTREMITY WITH ULCERATION (HCC): Primary | ICD-10-CM

## 2024-11-14 DIAGNOSIS — I70.248 ATHEROSCLEROSIS OF NATIVE ARTERIES OF LEFT LEG WITH ULCERATION OF OTHER PART OF LOWER LEG (HCC): ICD-10-CM

## 2024-11-14 DIAGNOSIS — I70.299 ATHEROSCLEROSIS OF ARTERY OF EXTREMITY WITH ULCERATION (HCC): Primary | ICD-10-CM

## 2024-11-14 PROCEDURE — 99213 OFFICE O/P EST LOW 20 MIN: CPT

## 2024-11-14 NOTE — TELEPHONE ENCOUNTER
Patient's daughter calling regards appointment with cardiology. States was told Dr Najjar was going to reach out regards sooner appointment. Please call.

## 2024-11-15 NOTE — TELEPHONE ENCOUNTER
I spoke with Dr. Erwin yesterday and his office will be reaching out to the daughter to try the have the patient be seen before December.  I spoke with the daughter today and informed her of that.

## 2024-11-16 NOTE — PROGRESS NOTES
Samer F. Najjar, MD  Vascular Surgery  [facility]       VASCULAR SURGERY OFFICE NOTE        Name: Guillermo White   : 1950  [id number]     REASON FOR VISIT:   Patient is a 74 year old male who is here to discuss treatment for his ongoing left ankle area ulceration. He underwent a recent diagnostic angiogram that revealed severe tibial disease with patency of the distal anterior tibial artery and the dorsalis pedis artery.     PAST MEDICAL HISTORY:     Past Medical History:    Aspiration pneumonia (HCC)    3/2023    Diabetes (HCC)    High blood pressure    High cholesterol    Lacunar infarction (HCC)    Osteomyelitis of lumbar spine (HCC)        TIA (transient ischemic attack)    Vestibular schwannoma (HCC)       PAST SURGICAL HISTORY:     Past Surgical History:   Procedure Laterality Date    Back surgery      Brain surgery      vestibular schwannoma @ [facility] w/left facial paralysis    Cataract      Cholecystectomy          MEDICATIONS:     Current Outpatient Medications:     metFORMIN HCl ER, OSM, 500 MG (OSM) Oral Tablet 24 Hr, , Disp: , Rfl:     amLODIPine 5 MG Oral Tab, Take 1 tablet (5 mg total) by mouth daily., Disp: , Rfl:     clopidogrel 75 MG Oral Tab, Take 1 tablet (75 mg total) by mouth daily., Disp: , Rfl:     ergocalciferol 1.25 MG (71939 UT) Oral Cap, Take 1 capsule (50,000 Units total) by mouth once a week., Disp: , Rfl:     gabapentin 100 MG Oral Cap, Take 1 capsule (100 mg total) by mouth 3 (three) times daily., Disp: , Rfl:     LANTUS SOLOSTAR 100 UNIT/ML Subcutaneous Solution Pen-injector, Inject 66 Units into the skin daily., Disp: , Rfl:     Insulin Lispro, 1 Unit Dial, 100 UNIT/ML Subcutaneous Solution Pen-injector, Inject 8 Units as directed 3 (three) times daily., Disp: , Rfl:     BD PEN NEEDLE SHANNON 2ND GEN 32G X 4 MM Does not apply Misc, 1 each 4 (four) times daily., Disp: , Rfl:     losartan-hydroCHLOROthiazide 100-25 MG Oral Tab, Take 1 tablet by mouth  daily., Disp: , Rfl:     rosuvastatin 40 MG Oral Tab, Take 1 tablet (40 mg total) by mouth nightly., Disp: , Rfl:     LABSS:     No results found for: \"EAG\", \"A1C\"   Lab Results   Component Value Date     (H) 10/23/2024    BUN 20 10/23/2024    CREATSERUM 1.24 10/23/2024    BUNCREA 16.1 10/23/2024    ANIONGAP 2 10/23/2024    CA 9.8 10/23/2024     10/23/2024    K 4.4 10/23/2024     10/23/2024    CO2 31.0 10/23/2024    OSMOCALC 298 (H) 10/23/2024      Lab Results   Component Value Date    WBC 8.2 10/23/2024    RBC 4.40 10/23/2024    HGB 13.4 10/23/2024    HCT 39.6 10/23/2024    MCV 90.0 10/23/2024    MCH 30.5 10/23/2024    MCHC 33.8 10/23/2024    RDW 13.9 10/23/2024    .0 (L) 10/23/2024        Lab Results   Component Value Date    HGB 13.4 10/23/2024    CREATSERUM 1.24 10/23/2024       EXAM:   Resp 20   Ht 5' 7\" (1.702 m)   Wt 180 lb (81.6 kg)   BMI 28.19 kg/m²     Left lateral malleolar ulcer is unchanged     ASSESSMENT    Diagnoses and all orders for this visit:    Atherosclerosis of artery of extremity with ulceration (HCC)      I explained to the patient and his daughter through the  that the patient would benefit from a popliteal to dorsalis pedis bypass in order to maximize the flow to his left ankle area.  The patient will need to undergo cardiac clearance first.  I have arranged for him to see Dr. Erwin.  I have also arranged for him to undergo a vein mapping study. We discussed the risks of MI, need for transfusion, bleeding, wound complications, nerve injury, thrombosis, restenosis, infection, and need for more open surgery among other complications including an amputation. Patient understood and all questions were answered. The patient has expressed a wish to proceed.     PLAN:     Plan for a left popliteal to dorsalis pedis bypass in about a month        Samer F. Najjar MD  Division of Vascular Surgery

## 2024-11-19 ENCOUNTER — TELEPHONE (OUTPATIENT)
Facility: CLINIC | Age: 74
End: 2024-11-19

## 2024-11-19 DIAGNOSIS — L97.909 ATHEROSCLEROSIS OF ARTERY OF EXTREMITY WITH ULCERATION (HCC): Primary | ICD-10-CM

## 2024-11-19 DIAGNOSIS — I70.299 ATHEROSCLEROSIS OF ARTERY OF EXTREMITY WITH ULCERATION (HCC): Primary | ICD-10-CM

## 2024-11-20 ENCOUNTER — TELEPHONE (OUTPATIENT)
Facility: CLINIC | Age: 74
End: 2024-11-20

## 2024-11-20 DIAGNOSIS — L97.909 ATHEROSCLEROSIS OF ARTERY OF EXTREMITY WITH ULCERATION (HCC): ICD-10-CM

## 2024-11-20 DIAGNOSIS — I70.248 ATHEROSCLEROSIS OF NATIVE ARTERIES OF LEFT LEG WITH ULCERATION OF OTHER PART OF LOWER LEG (HCC): Primary | ICD-10-CM

## 2024-11-20 DIAGNOSIS — I70.299 ATHEROSCLEROSIS OF ARTERY OF EXTREMITY WITH ULCERATION (HCC): ICD-10-CM

## 2024-11-20 NOTE — TELEPHONE ENCOUNTER
Patient needs left popliteal to dorsalis pedis bypass procedure.   Called and spoke with patient's daughter Griselda. Patient scheduled for 12/02/2024. She was receptive. Let her know that a nurse from the PAT Department will call with instructions for procedure. She understood.

## 2024-11-20 NOTE — TELEPHONE ENCOUNTER
Patient needs left popliteal to dorsalis pedis bypass procedure.  Called [facility] ([phone]) and spoke with Shanna.  Case initiated. Pending clinical review.  Clinicals faxed to: [phone]  Case #: [id number]

## 2024-11-21 ENCOUNTER — TELEPHONE (OUTPATIENT)
Facility: CLINIC | Age: 74
End: 2024-11-21

## 2024-11-21 NOTE — TELEPHONE ENCOUNTER
Received a call from Denita CHURCHILL RN at [facility]. She states procedure has been approved.  Auth #: [id number]  Valid: 12/02/2024-12/06/2024

## 2024-11-21 NOTE — TELEPHONE ENCOUNTER
Called Dneita engel ([phone]), let her know the clinicals were faxed yesterday and I received a fax confirmation. She states she hasn't received the clinicals but they could be resent with Attn: Denita CHURCHILL. She will call back with a determination once received. Refaxed clinicals, fax confirmation received.

## 2024-11-21 NOTE — TELEPHONE ENCOUNTER
Received a call from Deinta CHURCHILL RN at [facility]. She states procedure has been approved.  Auth #: [id number]  Valid: 12/02/2024-12/06/2024

## 2024-11-21 NOTE — TELEPHONE ENCOUNTER
Also see 11/20/2024 telephone encounter - Denita requesting clinicals to be faxed to [phone] attn: Denita CHURCHILL with PA# [id number] - requesting this to be sent today to get response for prior authorization that was placed for procedure.  For additional questions please call.  Thank you.

## 2024-11-22 RX ORDER — NIFEDIPINE 60 MG/1
60 TABLET, EXTENDED RELEASE ORAL DAILY
COMMUNITY
Start: 2024-11-18

## 2024-11-22 RX ORDER — MULTIVIT-MIN/IRON FUM/FOLIC AC 7.5 MG-4
1 TABLET ORAL DAILY
COMMUNITY

## 2024-11-25 ENCOUNTER — LAB ENCOUNTER (OUTPATIENT)
Dept: LAB | Facility: HOSPITAL | Age: 74
End: 2024-11-25
Attending: SURGERY
Payer: MEDICARE

## 2024-11-25 DIAGNOSIS — Z01.818 PREOP TESTING: ICD-10-CM

## 2024-11-25 LAB
ANION GAP SERPL CALC-SCNC: 8 MMOL/L (ref 0–18)
ANTIBODY SCREEN: NEGATIVE
ATRIAL RATE: 86 BPM
BASOPHILS # BLD AUTO: 0.03 X10(3) UL (ref 0–0.2)
BASOPHILS NFR BLD AUTO: 0.5 %
BUN BLD-MCNC: 25 MG/DL (ref 9–23)
BUN/CREAT SERPL: 19.8 (ref 10–20)
CALCIUM BLD-MCNC: 10.1 MG/DL (ref 8.7–10.4)
CHLORIDE SERPL-SCNC: 106 MMOL/L (ref 98–112)
CO2 SERPL-SCNC: 28 MMOL/L (ref 21–32)
CREAT BLD-MCNC: 1.26 MG/DL
DEPRECATED RDW RBC AUTO: 46.3 FL (ref 35.1–46.3)
EGFRCR SERPLBLD CKD-EPI 2021: 59 ML/MIN/1.73M2 (ref 60–?)
EOSINOPHIL # BLD AUTO: 0.21 X10(3) UL (ref 0–0.7)
EOSINOPHIL NFR BLD AUTO: 3.2 %
ERYTHROCYTE [DISTWIDTH] IN BLOOD BY AUTOMATED COUNT: 13.9 % (ref 11–15)
GLUCOSE BLD-MCNC: 140 MG/DL (ref 70–99)
HCT VFR BLD AUTO: 39.3 %
HGB BLD-MCNC: 13.2 G/DL
IMM GRANULOCYTES # BLD AUTO: 0.02 X10(3) UL (ref 0–1)
IMM GRANULOCYTES NFR BLD: 0.3 %
LYMPHOCYTES # BLD AUTO: 1.81 X10(3) UL (ref 1–4)
LYMPHOCYTES NFR BLD AUTO: 27.4 %
MCH RBC QN AUTO: 30.5 PG (ref 26–34)
MCHC RBC AUTO-ENTMCNC: 33.6 G/DL (ref 31–37)
MCV RBC AUTO: 90.8 FL
MONOCYTES # BLD AUTO: 0.6 X10(3) UL (ref 0.1–1)
MONOCYTES NFR BLD AUTO: 9.1 %
MRSA DNA SPEC QL NAA+PROBE: NEGATIVE
NEUTROPHILS # BLD AUTO: 3.93 X10 (3) UL (ref 1.5–7.7)
NEUTROPHILS # BLD AUTO: 3.93 X10(3) UL (ref 1.5–7.7)
NEUTROPHILS NFR BLD AUTO: 59.5 %
OSMOLALITY SERPL CALC.SUM OF ELEC: 301 MOSM/KG (ref 275–295)
P AXIS: 40 DEGREES
P-R INTERVAL: 176 MS
PLATELET # BLD AUTO: 131 10(3)UL (ref 150–450)
POTASSIUM SERPL-SCNC: 3.6 MMOL/L (ref 3.5–5.1)
Q-T INTERVAL: 370 MS
QRS DURATION: 86 MS
QTC CALCULATION (BEZET): 442 MS
R AXIS: -29 DEGREES
RBC # BLD AUTO: 4.33 X10(6)UL
RH BLOOD TYPE: POSITIVE
SODIUM SERPL-SCNC: 142 MMOL/L (ref 136–145)
T AXIS: 99 DEGREES
VENTRICULAR RATE: 86 BPM
WBC # BLD AUTO: 6.6 X10(3) UL (ref 4–11)

## 2024-11-25 PROCEDURE — 86901 BLOOD TYPING SEROLOGIC RH(D): CPT

## 2024-11-25 PROCEDURE — 85025 COMPLETE CBC W/AUTO DIFF WBC: CPT

## 2024-11-25 PROCEDURE — 86900 BLOOD TYPING SEROLOGIC ABO: CPT

## 2024-11-25 PROCEDURE — 93005 ELECTROCARDIOGRAM TRACING: CPT

## 2024-11-25 PROCEDURE — 36415 COLL VENOUS BLD VENIPUNCTURE: CPT

## 2024-11-25 PROCEDURE — 87641 MR-STAPH DNA AMP PROBE: CPT

## 2024-11-25 PROCEDURE — 86850 RBC ANTIBODY SCREEN: CPT

## 2024-11-25 PROCEDURE — 80048 BASIC METABOLIC PNL TOTAL CA: CPT

## 2024-11-25 PROCEDURE — 93010 ELECTROCARDIOGRAM REPORT: CPT | Performed by: INTERNAL MEDICINE

## 2024-12-02 ENCOUNTER — ANESTHESIA (OUTPATIENT)
Dept: PREOP | Facility: HOSPITAL | Age: 74
End: 2024-12-02
Payer: MEDICARE

## 2024-12-02 ENCOUNTER — HOSPITAL ENCOUNTER (INPATIENT)
Facility: HOSPITAL | Age: 74
LOS: 1 days | Discharge: HOME | End: 2024-12-02
Attending: SURGERY | Admitting: SURGERY
Payer: MEDICARE

## 2024-12-02 ENCOUNTER — ANESTHESIA EVENT (OUTPATIENT)
Dept: PREOP | Facility: HOSPITAL | Age: 74
End: 2024-12-02
Payer: MEDICARE

## 2024-12-02 VITALS
BODY MASS INDEX: 27.78 KG/M2 | WEIGHT: 177 LBS | HEIGHT: 67 IN | TEMPERATURE: 98 F | RESPIRATION RATE: 18 BRPM | OXYGEN SATURATION: 95 % | HEART RATE: 103 BPM | SYSTOLIC BLOOD PRESSURE: 125 MMHG | DIASTOLIC BLOOD PRESSURE: 73 MMHG

## 2024-12-02 DIAGNOSIS — Z01.818 PREOP TESTING: Primary | ICD-10-CM

## 2024-12-02 LAB
GLUCOSE BLDC GLUCOMTR-MCNC: 153 MG/DL (ref 70–99)
RH BLOOD TYPE: POSITIVE

## 2024-12-02 PROCEDURE — 82962 GLUCOSE BLOOD TEST: CPT

## 2024-12-02 RX ORDER — NICOTINE POLACRILEX 4 MG
30 LOZENGE BUCCAL
OUTPATIENT
Start: 2024-12-02

## 2024-12-02 RX ORDER — NALOXONE HYDROCHLORIDE 0.4 MG/ML
80 INJECTION, SOLUTION INTRAMUSCULAR; INTRAVENOUS; SUBCUTANEOUS AS NEEDED
OUTPATIENT
Start: 2024-12-02 | End: 2024-12-02

## 2024-12-02 RX ORDER — SODIUM CHLORIDE, SODIUM LACTATE, POTASSIUM CHLORIDE, CALCIUM CHLORIDE 600; 310; 30; 20 MG/100ML; MG/100ML; MG/100ML; MG/100ML
INJECTION, SOLUTION INTRAVENOUS CONTINUOUS
Status: DISCONTINUED | OUTPATIENT
Start: 2024-12-02 | End: 2024-12-02

## 2024-12-02 RX ORDER — HYDROMORPHONE HYDROCHLORIDE 1 MG/ML
0.6 INJECTION, SOLUTION INTRAMUSCULAR; INTRAVENOUS; SUBCUTANEOUS EVERY 5 MIN PRN
OUTPATIENT
Start: 2024-12-02

## 2024-12-02 RX ORDER — HYDROMORPHONE HYDROCHLORIDE 1 MG/ML
0.2 INJECTION, SOLUTION INTRAMUSCULAR; INTRAVENOUS; SUBCUTANEOUS EVERY 5 MIN PRN
OUTPATIENT
Start: 2024-12-02

## 2024-12-02 RX ORDER — DEXTROSE MONOHYDRATE 25 G/50ML
50 INJECTION, SOLUTION INTRAVENOUS
OUTPATIENT
Start: 2024-12-02

## 2024-12-02 RX ORDER — NICOTINE POLACRILEX 4 MG
15 LOZENGE BUCCAL
OUTPATIENT
Start: 2024-12-02

## 2024-12-02 RX ORDER — SODIUM CHLORIDE, SODIUM LACTATE, POTASSIUM CHLORIDE, CALCIUM CHLORIDE 600; 310; 30; 20 MG/100ML; MG/100ML; MG/100ML; MG/100ML
INJECTION, SOLUTION INTRAVENOUS CONTINUOUS
OUTPATIENT
Start: 2024-12-02

## 2024-12-02 RX ORDER — HYDROMORPHONE HYDROCHLORIDE 1 MG/ML
0.4 INJECTION, SOLUTION INTRAMUSCULAR; INTRAVENOUS; SUBCUTANEOUS EVERY 5 MIN PRN
OUTPATIENT
Start: 2024-12-02

## 2024-12-02 RX ORDER — MORPHINE SULFATE 10 MG/ML
6 INJECTION, SOLUTION INTRAMUSCULAR; INTRAVENOUS EVERY 10 MIN PRN
OUTPATIENT
Start: 2024-12-02

## 2024-12-02 RX ORDER — MORPHINE SULFATE 2 MG/ML
2 INJECTION, SOLUTION INTRAMUSCULAR; INTRAVENOUS EVERY 10 MIN PRN
OUTPATIENT
Start: 2024-12-02

## 2024-12-02 RX ORDER — ACETAMINOPHEN 500 MG
1000 TABLET ORAL ONCE
Status: COMPLETED | OUTPATIENT
Start: 2024-12-02 | End: 2024-12-02

## 2024-12-02 RX ORDER — MORPHINE SULFATE 4 MG/ML
4 INJECTION, SOLUTION INTRAMUSCULAR; INTRAVENOUS EVERY 10 MIN PRN
OUTPATIENT
Start: 2024-12-02

## 2024-12-02 NOTE — H&P
Samer F. Najjar, MD  Vascular Surgery  [facility]                     VASCULAR SURGERY   HP NOTE           Name: Guillermo White   :   1950  [id number]      REFERRING PHYSICIAN:  No ref. provider found  PRIMARY CARE PHYSICIAN:  PHYSICIAN NONSTAFF     HISTORY OF PRESENT ILLNESS:   Patient is a 74 year old male who is here for a popliteal to dorsalis pedis bypass due to nonhealing left lateral ankle wounds. He has been treated for the past 3  months at the [place] [facility] [place].  And prior to that wounds have been present for at least 6 months.  The patient underwent an angiogram that revealed occlusion of the anterior tibial artery with reconstitution of the dorsalis pedis.  The daughter states that the wound started to heal faster and one of the wounds have actually healed.       PAST MEDICAL HISTORY:    Past Medical History         Past Medical History:    Aspiration pneumonia (HCC)     3/2023    Diabetes (HCC)    High blood pressure    High cholesterol    Lacunar infarction (HCC)    Osteomyelitis of lumbar spine (HCC)         TIA (transient ischemic attack)    Vestibular schwannoma ([facility])            PAST SURGICAL HISTORY:   Past Surgical History             Past Surgical History:   Procedure Laterality Date    Back surgery        Brain surgery         vestibular schwannoma @ [facility] w/left facial paralysis    Cataract        Cholecystectomy                MEDICATIONS:      Medications - Current      Current Outpatient Medications:     amLODIPine 5 MG Oral Tab, Take 1 tablet (5 mg total) by mouth daily., Disp: , Rfl:     clopidogrel 75 MG Oral Tab, Take 1 tablet (75 mg total) by mouth daily., Disp: , Rfl:     ergocalciferol 1.25 MG (36288 UT) Oral Cap, Take 1 capsule (50,000 Units total) by mouth once a week., Disp: , Rfl:     gabapentin 100 MG Oral Cap, Take 1 capsule (100 mg total) by mouth 3 (three) times daily., Disp: , Rfl:     LANTUS SOLOSTAR 100 UNIT/ML Subcutaneous Solution  Pen-injector, Inject 66 Units into the skin daily., Disp: , Rfl:     Insulin Lispro, 1 Unit Dial, 100 UNIT/ML Subcutaneous Solution Pen-injector, Inject 8 Units as directed 3 (three) times daily., Disp: , Rfl:     BD PEN NEEDLE SHANNON 2ND GEN 32G X 4 MM Does not apply Misc, 1 each 4 (four) times daily., Disp: , Rfl:     losartan-hydroCHLOROthiazide 100-25 MG Oral Tab, Take 1 tablet by mouth daily., Disp: , Rfl:     rosuvastatin 40 MG Oral Tab, Take 1 tablet (40 mg total) by mouth nightly., Disp: , Rfl:     metFORMIN HCl ER, OSM, 500 MG (OSM) Oral Tablet 24 Hr, , Disp: , Rfl:          ALLERGIES:    He has No Known Allergies.     SOCIAL HISTORY:    Patient  reports that he has never smoked. He has never used smokeless tobacco.     FAMILY HISTORY:    Patient's family history is not on file.     ROS:     A 12 point review of systems with pertinent positives and negatives listed in the HPI.     EXAM:     Resp 18   Ht 5' 7\" (1.702 m)   Wt 180 lb (81.6 kg)   BMI 28.19 kg/m²   GENERAL: alert and orientated X 3, well developed, well nourished, in no apparent distress  PSYCH: normal mood and affect  HEENT: ears and throat are clear  NECK: supple, no lymphadenopathy, thyroid wnl  CAROTID: no bruits  RESPIRATORY: no rales, rhonchi, or wheezes B  CARDIO: RRR without murmur, no murmur, no gallop   ABDOMEN: soft, non-tender with no palpable aneurysm or masses  BACK: normal, no tenderness  SKIN: no rashes, warm and dry  EXTREMITIES: no tenderness  NEURO: no sensory or motor deficits  VASCULAR:        Femoral Popliteal DP PT Peroneal   Right 2+       monophasic signal monophasic signal     Left 2+       monophasic signal monophasic signal        Left supralateral malleolar ulcer that has now formed excellent granulation tissue with another ulcer fully healed.     LABS:   No results found for: \"EAG\", \"A1C\"   No results found for: \"GLU\", \"BUN\", \"CREATSERUM\", \"BUNCREA\", \"ANIONGAP\", \"GFRAA\", \"GFRNAA\", \"CA\", \"NA\", \"K\", \"CL\", \"CO2\",  \"OSMOCALC\"   No results found for: \"WBC\", \"RBC\", \"HGB\", \"HCT\", \"MCV\", \"MCH\", \"MCHC\", \"RDW\", \"PLT\", \"MPV\"      No results found for: \"HGB\", \"CREATSERUM\"        ASSESSMENT/PLAN:     I have reviewed the patient's prior documentation and studies from Epic and from [employer] [facility].     Diagnoses and all orders for this visit:     Atherosclerosis of artery of extremity with ulceration (HCC)      The patient has a non-healing LLE wound that appears to have made significant progress since the last time I have seen the patient which is about a month ago.  One of the ulcers is healed and the other 1 has formed excellent granulation tissue with early epithelialization and therefore I recommended canceling the procedure for today.  There is a small chance that he may require the bypass in the future but I think given the progression of this ulcer, this will be unlikely.  I explained all of this to the family and the patient who are happy with this.   I have recommended that he apply a Band-Aid with compression socks.  We will see the patient in our clinic in 2 weeks.        Sincerely,  Samer F. Najjar, MD

## 2024-12-02 NOTE — ANESTHESIA PREPROCEDURE EVALUATION
Anesthesia PreOp Note    HPI:     Guillermo White is a 74 year old male who presents for preoperative consultation requested by: Najjar, Samer F, MD    Date of Surgery: 12/2/2024    Procedure(s):  Left popliteal to dorsalis pedis bypass  Indication: Atherosclerosis of artery of extremity with ulceration (HCC) [I70.299, L97.909]    Relevant Problems   No relevant active problems       NPO:  Last Liquid Consumption Date: 12/01/24  Last Liquid Consumption Time: 2100  Last Solid Consumption Date: 12/01/24  Last Solid Consumption Time: 2100  Last Liquid Consumption Date: 12/01/24          History Review:  Patient Active Problem List    Diagnosis Date Noted    Hypertension 09/25/2024    Non-pressure chronic ulcer of left lower leg with fat layer exposed ([facility]) 09/19/2024    Non-pressure chronic ulcer right lower leg, limited to breakdown skin ([facility]) 09/19/2024    Abnormal ankle brachial index ([facility]) 09/19/2024    Uncontrolled type 2 diabetes mellitus with hyperglycemia, with long-term current use of insulin ([facility]) 09/19/2024       Past Medical History:    Anesthesia complication    hallucination, no violent tendency per daughter    Aspiration pneumonia ([facility])    3/2023    Back problem    Diabetes ([facility])    High blood pressure    High cholesterol    Lacunar infarction ([facility])    Osteomyelitis of lumbar spine ([facility])    2010    TIA (transient ischemic attack)    Vestibular schwannoma ([facility])       Past Surgical History:   Procedure Laterality Date    Back surgery      Brain surgery      vestibular schwannoma @ [facility] w/left facial paralysis    Cataract      Cholecystectomy      Eye surgery Left        Prescriptions Prior to Admission[1]  Current Medications and Prescriptions Ordered in Epic[2]    Allergies[3]    Family History   Problem Relation Age of Onset    No Known Problems Father     Heart Disorder Mother     Diabetes Brother      Social History     Socioeconomic History    Marital status:    Tobacco Use    Smoking  status: Never    Smokeless tobacco: Never   Vaping Use    Vaping status: Never Used   Substance and Sexual Activity    Alcohol use: Not Currently     Comment: rarely    Drug use: Never       Available pre-op labs reviewed.  Lab Results   Component Value Date    WBC 6.6 11/25/2024    RBC 4.33 11/25/2024    HGB 13.2 11/25/2024    HCT 39.3 11/25/2024    MCV 90.8 11/25/2024    MCH 30.5 11/25/2024    MCHC 33.6 11/25/2024    RDW 13.9 11/25/2024    .0 (L) 11/25/2024     Lab Results   Component Value Date     11/25/2024    K 3.6 11/25/2024     11/25/2024    CO2 28.0 11/25/2024    BUN 25 (H) 11/25/2024    CREATSERUM 1.26 11/25/2024     (H) 11/25/2024    PGLU 153 (H) 12/02/2024    CA 10.1 11/25/2024          Vital Signs:  Body mass index is 27.72 kg/m².   height is 1.702 m (5' 7\") and weight is 80.3 kg (177 lb). His oral temperature is 97.8 °F (36.6 °C). His blood pressure is 125/73 and his pulse is 103. His respiration is 18 and oxygen saturation is 95%.   Vitals:    11/22/24 1745 12/02/24 1045   BP:  125/73   Pulse:  103   Resp:  18   Temp:  97.8 °F (36.6 °C)   TempSrc:  Oral   SpO2:  95%   Weight: 83 kg (183 lb) 80.3 kg (177 lb)   Height: 1.702 m (5' 7\")         Anesthesia Evaluation     Patient summary reviewed and Nursing notes reviewed    History of anesthetic complications   Airway   Mallampati: II  TM distance: >3 FB  Dental          Pulmonary - normal exam   Cardiovascular - normal exam  (+) hypertension well controlled    NYHA Classification: I  ECG reviewed  ROS comment: Dr Erwin 11.25.24 cardiac clearance     Neuro/Psych    (+)  neuromuscular disease, TIA,        Comments: Mastoidectomy > 20 years ago   TIA / no residual     GI/Hepatic/Renal    (-) hepatitis, liver disease    Endo/Other    (+) diabetes mellitus type 2 well controlled    Comments: 11/25/24 07:22  Glucose: 140 (H)  Sodium: 142  Potassium: 3.6  Chloride: 106  Carbon Dioxide, Total: 28.0  BUN: 25 (H)  CREATININE:  1.26  CALCIUM: 10.1  BUN/CREATININE RATIO: 19.8  EGFR: 59 (L)  ANION GAP: 8  CALCULATED OSMOLALITY: 301 (H)      (H): Data is abnormally high  (L): Data is abnormally low  Abdominal  - normal exam     Other findings: 11/25/24 07:22  WBC: 6.6  Hemoglobin: 13.2  Hematocrit: 39.3  Platelet Count: 131.0 (L)  RBC: 4.33  MCH: 30.5  MCHC: 33.6  MCV: 90.8  RDW: 13.9      (L): Data is abnormally low            Anesthesia Plan:   ASA:  3  Plan:   General  Monitors and Lines:   A-line and Additonal IV  Airway:  ETT  Plan Comments: RN is interpreting   Informed Consent Plan and Risks Discussed With:  Patient  Discussed plan with:  CRNA, attending and surgeon  Provider Attestation (if preop done by other):  GA/ ETT/lines / PONV,dental damages etc      I have informed Guillermo White and/or legal guardian or family member of the nature of the anesthetic plan, benefits, risks including possible dental damage if relevant, major complications, and any alternative forms of anesthetic management.   All of the patient's questions were answered to the best of my ability. The patient desires the anesthetic management as planned.  I have informed Guillermo White  relative] of the risks of arterial lines including, but not limited to: failure, infection, bleeding, nerve damage, and vascular occlusion. The patient desires the line as proposed.    KIMBERLYN CASTRO MD  12/2/2024 12:02 PM  Present on Admission:  **None**           [1]   Medications Prior to Admission   Medication Sig Dispense Refill Last Dose/Taking    NIFEdipine ER 60 MG Oral Tablet 24 Hr Take 1 tablet (60 mg total) by mouth daily.   12/2/2024 at  7:00 AM    Multiple Vitamins-Minerals (MULTI-VITAMIN/MINERALS) Oral Tab Take 1 tablet by mouth daily.   Past Week    metFORMIN HCl ER, OSM, 500 MG (OSM) Oral Tablet 24 Hr Take 1 tablet (500 mg total) by mouth 2 (two) times daily with meals.   12/1/2024 at  9:00 AM    clopidogrel 75 MG Oral Tab Take 1 tablet (75 mg total) by mouth  daily.   12/1/2024 Morning    LANTUS SOLOSTAR 100 UNIT/ML Subcutaneous Solution Pen-injector Inject 66 Units into the skin every morning.   12/2/2024 at  9:00 AM    losartan-hydroCHLOROthiazide 100-25 MG Oral Tab Take 1 tablet by mouth every morning.   12/2/2024 at  7:00 AM    rosuvastatin 40 MG Oral Tab Take 1 tablet (40 mg total) by mouth nightly.   12/1/2024 Bedtime    amLODIPine 5 MG Oral Tab Take 1 tablet (5 mg total) by mouth every morning. (Patient not taking: Reported on 11/22/2024)   Not Taking    gabapentin 100 MG Oral Cap Take 1 capsule (100 mg total) by mouth 3 (three) times daily.   More than a month    BD PEN NEEDLE SHANNON 2ND GEN 32G X 4 MM Does not apply Misc 1 each 4 (four) times daily.      [2]   Current Facility-Administered Medications Ordered in Epic   Medication Dose Route Frequency Provider Last Rate Last Admin    lactated ringers infusion   Intravenous Continuous Najjar, Samer F, MD 20 mL/hr at 12/02/24 1051 New Bag at 12/02/24 1051    ceFAZolin (Ancef) 2g in 10mL IV syringe premix  2 g Intravenous Once Najjar, Samer F, MD        heparin (Porcine) 50,000 units in 500 mL sodium chloride 0.9% irrigation  50,000 Units Irrigation Once (Intra-Op) Najjar, Samer F, MD         No current [facility]-ordered outpatient medications on file.   [3] No Known Allergies

## 2024-12-09 ENCOUNTER — TELEPHONE (OUTPATIENT)
Dept: WOUND CARE | Facility: HOSPITAL | Age: 74
End: 2024-12-09

## 2024-12-09 NOTE — TELEPHONE ENCOUNTER
Patient daughter is calling states would liek to reschedule appt on 12/18. States he has another appt on 12/19. Offered the 2:00 opening, unable to come at that time. Is there another day pt can get scheduled?

## 2024-12-17 ENCOUNTER — TELEPHONE (OUTPATIENT)
Dept: WOUND CARE | Facility: HOSPITAL | Age: 74
End: 2024-12-17

## 2024-12-17 NOTE — TELEPHONE ENCOUNTER
Daughter called requesting for dressing supplies ordered for the patient, explained to her that the patient has an appointment on 12/19 and will order it by then depending on what the dressing is ordered by the APRN based on her assessment that day. She verbalized understanding.

## 2024-12-18 ENCOUNTER — APPOINTMENT (OUTPATIENT)
Dept: WOUND CARE | Facility: HOSPITAL | Age: 74
End: 2024-12-18
Attending: NURSE PRACTITIONER
Payer: MEDICARE

## 2024-12-19 ENCOUNTER — OFFICE VISIT (OUTPATIENT)
Dept: WOUND CARE | Facility: HOSPITAL | Age: 74
End: 2024-12-19
Attending: NURSE PRACTITIONER
Payer: MEDICARE

## 2024-12-19 VITALS
OXYGEN SATURATION: 93 % | RESPIRATION RATE: 16 BRPM | HEART RATE: 94 BPM | SYSTOLIC BLOOD PRESSURE: 137 MMHG | TEMPERATURE: 98 F | DIASTOLIC BLOOD PRESSURE: 79 MMHG

## 2024-12-19 DIAGNOSIS — E11.59 TYPE 2 DIABETES MELLITUS WITH OTHER CIRCULATORY COMPLICATION, WITH LONG-TERM CURRENT USE OF INSULIN (HCC): ICD-10-CM

## 2024-12-19 DIAGNOSIS — E08.622 DIABETIC ULCER OF LEFT LOWER LEG ASSOCIATED WITH DIABETES MELLITUS DUE TO UNDERLYING CONDITION, LIMITED TO BREAKDOWN OF SKIN (HCC): Primary | ICD-10-CM

## 2024-12-19 DIAGNOSIS — L97.921 DIABETIC ULCER OF LEFT LOWER LEG ASSOCIATED WITH DIABETES MELLITUS DUE TO UNDERLYING CONDITION, LIMITED TO BREAKDOWN OF SKIN (HCC): Primary | ICD-10-CM

## 2024-12-19 DIAGNOSIS — Z79.4 TYPE 2 DIABETES MELLITUS WITH OTHER CIRCULATORY COMPLICATION, WITH LONG-TERM CURRENT USE OF INSULIN (HCC): ICD-10-CM

## 2024-12-19 PROCEDURE — 99213 OFFICE O/P EST LOW 20 MIN: CPT | Performed by: NURSE PRACTITIONER

## 2024-12-19 NOTE — PROGRESS NOTES
Chief Complaint   Patient presents with    Wound Care     Left lateral ankle     HPI:   12/19/24: F/u visit on left lower leg wounds. Daughter has been applying medihoney and hydrofera blue to the wounds once a week with good improvement. Vascular intervention was cancelled due to the significant improvement.      11/14/24: F/u visit for lower leg wounds. Underwent diagnostic angiogram 10/30/24. Saw Dr. Najjar this morning. His daughter reports he is planning for bypass surgery as soon as possible. Pt's daughter said the wounds have been improving. She is dressing the wound with medi-honey and hydrofera blue once a week.        10/17/24: F/u visit lower leg wounds. Scheduled for angiogram 10/30/24. Reports he does not currently have any pain.     10/10/24: Here today with daughter for follow up on left lateral ankle wounds. Saw Dr. Najjar prior to visit today. Planning for angiogram. Denies changes in health since last visit.    9/25/24: F/u left lateral ankle wounds and right ankle wound. Accompanied by his daughter, language line used for Telugu translation. C/o pain at night time, no pain with walking. Itching around ankle as well. Taking Tylenol 550 mg 1 tablet without relief. Has been changing medi-honey dressing as recommended.    9/19/24: F/u left lateral ankle wounds. New wound on right medial ankle. Here with his daughters. Language line used for Telugu translation. Pt reports he is doing well. Pain at wound sites is less than at previous visits.     9/11/24: F/u L lateral ankle wounds. Language line translating for the visit. He has been applying medi-honey and gauze to the wounds for the past week. Tolerating well. Daughter reports old wound site on left great toe has intermittent clear drainage. Pt reports pain is well controlled. C/o intermittent itching around left ankle. Daughter is asking to have motorized scooter ordered for patient out of concern for tiredness after walking short distances,  imbalance and mild SOB. No pain at wound site when walking    9/5/24: F/u visit on left lateral ankle wounds. Language line translated for visit. Pt tolerated dressing well, but felt dressing was tight. He says his pain varies to little pain up to 7/10. No change in health or new concerns.    8/29/24: 74 yr old male here today with his daughter for evaluation of left lateral ankle wounds. They are Greek speaking, language line was used for translation. Reports wounds have been present for at least 6 months. He went to [facility] ED 8/6/24 for evaluation of the wounds. He was treated for wound infection with a course of Augmentin 875mg. Pt says the redness around the wound has improved since then. He was previously receiving treatment for the wound in [place]. He has tried medi-honey and what appears to be the equivalent to Santyl. Reports both treatments caused increased pain at the wound site. Since he was at the ED, his daughter has been cleaning with an OTC wound cleanser and wounds have been left open to air. He has been s howering regularly. Additionally, he has wound on his left great toe that they think has healed, occasionally she notices a drop of drainage. Pt reports he currently has no pain at the wound site, but will experience sharp pain at the wound site at night time. He was given Gabapentin by [facility], but this has not helped. He denies known history of blood clots, CHF, PAD, Afib. Reports he takes Plavix for poor circulation. Reports taking Insulin as prescribed.      Medical Problems:  T2DM (uncontrolled)  HTN  Hyperlipidemia    Past Medical History:    Anesthesia complication    hallucination, no violent tendency per daughter    Aspiration pneumonia (HCC)    3/2023    Back problem    Diabetes (HCC)    High blood pressure    High cholesterol    Lacunar infarction (HCC)    Osteomyelitis of lumbar spine ([facility])    2010    TIA (transient ischemic attack)    Vestibular schwannoma ([facility])     Pertinent Labs:      Ref Range & Units 8/5/24 2200   WBC  3.5 - 10.5 K/UL 5.1   RBC  4.20 - 5.80 M/UL 4.65   HGB  13.0 - 17.5 GM/DL 13.5   HCT  38.0 - 54.0 % 40.9     BUN  7 - 22 MG/DL 25 High    CREATININE  0.6 - 1.4 MG/DL 1.07   GLUCOSE  70 - 100 MG/ High      ESTIMATED GFR  >59 ML/MIN/1.73M2 73     SED RATE  0 - 25 MM 19     C REACTIVE PROTEIN  <8.1 MG/L 2.0     HARJINDER Result 9/11/24:   PROCEDURE: US ANKLE BRACHIAL INDEX (CPT=93922)    COMPARISON: None.   INDICATIONS: E11.59 Type 2 diabetes mellitus with other circulatory complications ([facility]) L97.922 Non-pressure chronic ulcer of left lower leg with fat layer exposed ([facility])      TECHNIQUE: Segmental pressures and Doppler wave forms were obtained in the ankles and feet.       SEGMENTAL BP   PRESSURE INDEX      RIGHT   BRACHIAL: 150 mmHg   POSTERIOR TIBIAL: 87 mmHg 0.6   DORSALIS PEDIS: 100 mmHg 0.7       LEFT   BRACHIAL: 143 mmHg   POSTERIOR TIBIAL: 80 mmHg 0.5   DORSALIS PEDIS: 76 mmHg 0.5        Impression   CONCLUSION:  1. Moderately moderate to severe decrease in resting ankle-brachial indices, with an HARJINDER of 0.5 on the symptomatic left side.       Outside Imaging:  EXAM: AP and lateral views of the left tibia and fibula   DATE: 8/5/2024 11:49 PM   CLINICAL INDICATION: ulceration two months, treated in [place], seems worse to   family.   COMPARISON: None.   FINDINGS:   Bones:There is no acute fracture or dislocation.   Joints: Joint spaces are intact without degenerative proliferation.   Soft Tissues: Soft tissue ulceration over the lateral aspect of the distal lower   extremity.   IMPRESSION:   No acute fracture or dislocation of the left tibia or fibula.  No osseous   erosion.  Soft tissue ulceration distally.     EXAM: Non-Standing AP, lateral and oblique views of the left foot   DATE: 8/5/2024 11:49 PM   CLINICAL INDICATION: ulcerations left foot at base of great toe and left lower   leg distally/laterally.    COMPARISON: None.   FINDINGS:   Bones: There is no acute  fracture or dislocation.    Joints: Joint spaces show mild degenerative proliferation.   Soft Tissues: Focal soft tissue swelling at the medial aspect of the hallux   distal phalanx.     IMPRESSION:   No acute fracture-dislocation left foot.  No osseous erosion.  Soft tissue   swelling.       Current Outpatient Medications:     NIFEdipine ER 60 MG Oral Tablet 24 Hr, Take 1 tablet (60 mg total) by mouth daily., Disp: , Rfl:     Multiple Vitamins-Minerals (MULTI-VITAMIN/MINERALS) Oral Tab, Take 1 tablet by mouth daily., Disp: , Rfl:     metFORMIN HCl ER, OSM, 500 MG (OSM) Oral Tablet 24 Hr, Take 1 tablet (500 mg total) by mouth 2 (two) times daily with meals., Disp: , Rfl:     amLODIPine 5 MG Oral Tab, Take 1 tablet (5 mg total) by mouth every morning. (Patient not taking: Reported on 11/22/2024), Disp: , Rfl:     clopidogrel 75 MG Oral Tab, Take 1 tablet (75 mg total) by mouth daily., Disp: , Rfl:     gabapentin 100 MG Oral Cap, Take 1 capsule (100 mg total) by mouth 3 (three) times daily., Disp: , Rfl:     LANTUS SOLOSTAR 100 UNIT/ML Subcutaneous Solution Pen-injector, Inject 66 Units into the skin every morning., Disp: , Rfl:     BD PEN NEEDLE SHANNON 2ND GEN 32G X 4 MM Does not apply Misc, 1 each 4 (four) times daily., Disp: , Rfl:     losartan-hydroCHLOROthiazide 100-25 MG Oral Tab, Take 1 tablet by mouth every morning., Disp: , Rfl:     rosuvastatin 40 MG Oral Tab, Take 1 tablet (40 mg total) by mouth nightly., Disp: , Rfl:     Allergies:  No Known Allergies     REVIEW OF SYSTEMS:   Review of Systems   Constitutional:  Negative for activity change, appetite change, chills, fatigue and fever.   Respiratory:  Negative for shortness of breath.    Cardiovascular:  Negative for chest pain.   Gastrointestinal:  Negative for abdominal pain.   Musculoskeletal:  Positive for gait problem (ambulates with cane).   Skin:  Positive for wound. Negative for rash.   Neurological:  Negative for dizziness, numbness and headaches.    Psychiatric/Behavioral:          Daughter reports he sometimes gets depressed by non-healing wound     PHYSICAL EXAM:   /79   Pulse 94   Temp 97.9 °F (36.6 °C) (Oral)   Resp 16   SpO2 93%    Estimated body mass index is 27.72 kg/m² as calculated from the following:    Height as of 24: 67\".    Weight as of 24: 177 lb.   Vital signs reviewed.Appears stated age, well groomed.    Physical Exam  Constitutional:       General: He is not in acute distress.     Appearance: Normal appearance. He is not ill-appearing.   HENT:      Head: Normocephalic and atraumatic.      Comments: Left facial droop (hx of stroke)  Cardiovascular:      Rate and Rhythm: Normal rate.      Comments: 24 handheld doppler findings of biphasic wave sounds at right DP and PT pulses, monophasic wave sounds left DP and PT.    Pulmonary:      Effort: Pulmonary effort is normal.   Musculoskeletal:      Right lower leg: Edema present.      Left lower le+ Pitting Edema present.   Skin:     General: Skin is warm and dry.      Capillary Refill: Capillary refill takes 2 to 3 seconds.      Findings: Wound (left lateral ankle, left 1st toe R ankle; see wound care flowsheet) present. No rash.   Neurological:      General: No focal deficit present.      Mental Status: He is alert and oriented to person, place, and time.   Psychiatric:         Mood and Affect: Mood normal.         Behavior: Behavior normal.       Wound 24 3 Ankle Left;Lateral;Proximal (Active)   Date First Assessed/Time First Assessed: 24 1106    Wound Number (Wound Clinic Only): 3  Location: Ankle  Wound Location Orientation: Left;Lateral;Proximal      Assessments 2024 11:16 AM 2024  8:07 AM   Wound Image        Site Assessment Moist;Yellow Moist;Red;Yellow   Closure Not approximated Not approximated   Drainage Amount Scant Small   Drainage Description Yellow Yellow   Treatments Cleansed;Wound ;Saline Cleansed;Saline;Vashe;Honey Gel    Dressing Joanne;Tape Other   Dressing Changed New Changed   Dressing Status Dry;Intact;Clean Dry;Intact;Clean   Wound Length (cm) 1.9 cm 0.7 cm   Wound Width (cm) 1.6 cm 0.7 cm   Wound Surface Area (cm^2) 3.04 cm^2 0.49 cm^2   Wound Depth (cm) 0.1 cm 0.1 cm   Wound Volume (cm^3) 0.304 cm^3 0.049 cm^3   Wound Healing % -- 84   Margins Attached edges Well-defined edges   Non-staged Wound Description Full thickness Full thickness   Angelica-wound Assessment Clean;Dry;Blanchable erythema;Painful Dry;Pink   Wound Granulation Tissue -- Red   Wound Bed Granulation (%) 10 % 95 %   Wound Bed Epithelium (%) 0 % 0 %   Wound Bed Slough (%) 90 % 5 %   Wound Bed Eschar (%) 0 % 0 %   Wound Bed Fibrin (%) 0 % 0 %   State of Healing Non-healing Early/partial granulation   Wound Odor None None       Inactive Orders   Date Order Priority Status Authorizing Provider   11/14/24 1033 OP Wound Dressing Routine Discontinued Melany Hathaway APRN     - Cleansing:    Cleanse with normal saline or wound cleanser     - Dressing:    Honey gel     - Dressing:    Hydrofera transfer     - Dressing:    Dry gauze     - Additional Wound Dressing Information:    tape     - Frequency:    Change dressing weekly   10/17/24 1233 OP Wound Dressing Routine Completed Melany Hathaway APRN     - Cleansing:    Cleanse with normal saline or wound cleanser     - Dressing:    Honey gel     - Dressing:    Hydrofera transfer     - Dressing:    Conforming roll     - Additional Wound Dressing Information:    tape     - Frequency:    Change dressing every other day and PRN (prn)   10/16/24 1852 OP Wound Dressing Routine Completed Melany Hathaway APRN     - Cleansing:    Cleanse with normal saline or wound cleanser     - Cleansing:    Cleanse with Vashe     - Dressing:    Hydrofera transfer     - Dressing:    Dry gauze     - Dressing:    Conforming roll     - Frequency:    Change dressing two times per week (and prn)   10/10/24 1138 Debridement Left;Lateral;Proximal  Ankle Routine Completed Melany Hathaway APRN   09/25/24 1429 OP Wound Dressing Routine Discontinued Melany Hathaway APRN     - Cleansing:    Cleanse with normal saline or wound cleanser     - Dressing:    Honey gel     - Dressing:    Dry gauze     - Dressing:    Conforming roll     - Additional Wound Dressing Information:    tape. zinc oxide to periwound     - Frequency:    Change dressing every other day and PRN   09/19/24 1831 OP Wound Dressing Routine Completed Melany Hathaway APRN     - Cleansing:    Cleanse with normal saline or wound cleanser     - Cleansing:    Cleanse with Vashe     - Dressing:    Honey gel     - Dressing:    Dry gauze     - Dressing:    Kerlix     - Dressing:    Paper tape     - Frequency:    Change dressing every other day and PRN   09/19/24 1021 Debridement Left;Lateral;Proximal Ankle Routine Completed Melany Hathaway, STEPHANIE   09/11/24 1201 OP Wound Dressing Routine Discontinued Melany Hathaway APRN     - Cleansing:    Cleanse with normal saline or wound cleanser     - Cleansing:    Cleanse with Vashe     - Dressing:    Acticoat     - Dressing:    Dry gauze     - Dressing:    Conforming roll     - Dressing:    Paper tape     - Additional Wound Dressing Information:    betamethasone to periwound     - Frequency:    Change dressing weekly   09/11/24 0955 Debridement Left;Lateral;Proximal Ankle Routine Completed Melany Hathaway, STEPHANIE   09/05/24 1659 OP Wound Dressing Routine Discontinued Melany Hathaway APRN     - Cleansing:    Cleanse with normal saline or wound cleanser     - Cleansing:    Cleanse with Vashe     - Dressing:    Honey gel     - Dressing:    Dry gauze     - Dressing:    Conforming roll     - Dressing:    Paper tape     - Frequency:    Change dressing every three days   09/05/24 1401 Debridement Left;Lateral;Proximal Ankle Routine Completed Melany Hathaway APRN   08/29/24 1421 OP Wound Dressing Routine Discontinued Melany Hathaway APRN     - Cleansing:     Cleanse with normal saline or wound cleanser     - Dressing:    Enluxtra humidifiber     - Dressing:    Conforming roll     - Frequency:    Change dressing two times per week   08/29/24 1415 Debridement Left;Lateral;Proximal Ankle Routine Completed Melany Hathaway APRN       Wound 08/29/24 1 Ankle Left;Lateral;Distal (Active)   Date First Assessed/Time First Assessed: 08/29/24 1115    Wound Number (Wound Clinic Only): 1  Location: (c) Ankle  Wound Location Orientation: Left;Lateral;Distal      Assessments 8/29/2024 11:16 AM 12/19/2024  8:07 AM   Wound Image        Site Assessment Moist;Yellow;Pink;Red Moist;Pink   Closure Not approximated Not approximated   Drainage Amount Small Scant   Drainage Description Yellow Yellow   Treatments Cleansed;Saline;Wound  Cleansed;Saline;Vashe;Topical (Barrier/Moisturizer/Ointment);Honey Gel   Dressing Joanne;Tape Other   Dressing Changed New Changed   Dressing Status Clean;Dry;Intact Clean;Dry;Intact   Wound Length (cm) 1.4 cm 0.1 cm   Wound Width (cm) 1.3 cm 0.1 cm   Wound Surface Area (cm^2) 1.82 cm^2 0.01 cm^2   Wound Depth (cm) 0.2 cm 0.1 cm   Wound Volume (cm^3) 0.364 cm^3 0.001 cm^3   Wound Healing % -- 100   Margins Well-defined edges Well-defined edges   Non-staged Wound Description Full thickness Partial thickness   Angelica-wound Assessment Dry;Intact;Blanchable erythema;Painful Dry;Pink   Wound Granulation Tissue Pink;Red --   Wound Bed Granulation (%) 100 % --   Wound Bed Epithelium (%) 0 % 90 %   Wound Bed Slough (%) 20 % --   Wound Bed Eschar (%) 0 % --   Wound Bed Fibrin (%) 0 % --   State of Healing Non-healing Epithelialized   Wound Odor None None       Inactive Orders   Date Order Priority Status Authorizing Provider   11/14/24 1033 OP Wound Dressing Routine Discontinued Melany Hathaway APRN     - Cleansing:    Cleanse with normal saline or wound cleanser     - Dressing:    Honey gel     - Dressing:    Hydrofera transfer     - Dressing:    Dry gauze     -  Additional Wound Dressing Information:    tape     - Frequency:    Change dressing weekly   10/17/24 1233 OP Wound Dressing Routine Completed Melany Hathaway APRN     - Cleansing:    Cleanse with normal saline or wound cleanser     - Dressing:    Honey gel     - Dressing:    Hydrofera transfer     - Dressing:    Conforming roll     - Additional Wound Dressing Information:    tape     - Frequency:    Change dressing every other day and PRN (prn)   10/16/24 1852 OP Wound Dressing Routine Completed Melany Hathaway APRN     - Cleansing:    Cleanse with normal saline or wound cleanser     - Cleansing:    Cleanse with Vashe     - Dressing:    Hydrofera transfer     - Dressing:    Dry gauze     - Dressing:    Conforming roll     - Frequency:    Change dressing two times per week (and prn)   10/10/24 1150 Debridement Left;Lateral;Distal Ankle Routine Completed Melany Hathaway APRN   09/25/24 1429 OP Wound Dressing Routine Discontinued Melany Hathaway APRN     - Cleansing:    Cleanse with normal saline or wound cleanser     - Dressing:    Honey gel     - Dressing:    Dry gauze     - Dressing:    Conforming roll     - Additional Wound Dressing Information:    tape. zinc oxide to periwound     - Frequency:    Change dressing every other day and PRN   09/19/24 1831 OP Wound Dressing Routine Completed Melany Hathaway APRN     - Cleansing:    Cleanse with normal saline or wound cleanser     - Cleansing:    Cleanse with Vashe     - Dressing:    Honey gel     - Dressing:    Dry gauze     - Dressing:    Kerlix     - Dressing:    Paper tape     - Frequency:    Change dressing every other day and PRN   09/11/24 1201 OP Wound Dressing Routine Discontinued Melany Hathaway APRN     - Cleansing:    Cleanse with normal saline or wound cleanser     - Cleansing:    Cleanse with Vashe     - Dressing:    Acticoat     - Dressing:    Dry gauze     - Dressing:    Conforming roll     - Dressing:    Paper tape     - Additional Wound  Dressing Information:    betamethasone to periwound     - Frequency:    Change dressing weekly   09/11/24 0958 Debridement Left;Lateral;Distal Ankle Routine Completed Melany Hathaway APRN   09/05/24 1659 OP Wound Dressing Routine Discontinued Melany Hathaway APRN     - Cleansing:    Cleanse with normal saline or wound cleanser     - Cleansing:    Cleanse with Vashe     - Dressing:    Honey gel     - Dressing:    Dry gauze     - Dressing:    Conforming roll     - Dressing:    Paper tape     - Frequency:    Change dressing every three days   09/05/24 1400 Debridement Left;Lateral;Distal Ankle Routine Completed Melany Hathaway APRN   08/29/24 1421 OP Wound Dressing Routine Discontinued Melany Hathaway APRN     - Cleansing:    Cleanse with normal saline or wound cleanser     - Dressing:    Enluxtra humidifiber     - Dressing:    Conforming roll     - Frequency:    Change dressing two times per week   08/29/24 1416 Debridement Left;Lateral;Distal Ankle Routine Completed Melany Hathaway APRN       Wound 09/19/24 Ankle Right;Medial (Active)   Date First Assessed/Time First Assessed: 09/19/24 0944   Location: Ankle  Wound Location Orientation: Right;Medial      Assessments 9/19/2024  9:28 AM 12/19/2024  8:07 AM   Wound Image       Site Assessment Dry;Yellow;Pink Dry;Pink   Closure Not approximated Approximated   Drainage Amount None None   Treatments Cleansed;Saline;Vashe;Honey Gel --   Dressing Kerlix roll;Tape Open to air   Dressing Changed New --   Dressing Status Clean;Dry;Intact --   Wound Length (cm) 0.2 cm 0 cm   Wound Width (cm) 0.2 cm 0 cm   Wound Surface Area (cm^2) 0.04 cm^2 0 cm^2   Wound Depth (cm) 0.1 cm 0 cm   Wound Volume (cm^3) 0.004 cm^3 0 cm^3   Wound Healing % -- 100   Margins Well-defined edges Flat and Intact   Non-staged Wound Description Full thickness Not applicable   Angelica-wound Assessment Blanchable erythema;Dry Hyperpigmented;Dry;Intact   Wound Granulation Tissue Pink --   Wound Bed  Granulation (%) 5 % 0 %   Wound Bed Epithelium (%) 0 % 100 %   Wound Bed Slough (%) 95 % 0 %   Wound Bed Eschar (%) 0 % 0 %   Wound Bed Fibrin (%) 0 % 0 %   State of Healing Non-healing Epithelialized   Wound Odor None None       Inactive Orders   Date Order Priority Status Authorizing Provider   11/14/24 1033 OP Wound Dressing Routine Discontinued Melany Hathaway APRN     - Cleansing:    Cleanse with normal saline or wound cleanser     - Dressing:    Honey gel     - Dressing:    Hydrofera transfer     - Dressing:    Dry gauze     - Additional Wound Dressing Information:    tape     - Frequency:    Change dressing weekly   10/17/24 1233 OP Wound Dressing Routine Completed Melany Hathaway APRN     - Cleansing:    Cleanse with normal saline or wound cleanser     - Dressing:    Honey gel     - Dressing:    Hydrofera transfer     - Dressing:    Conforming roll     - Additional Wound Dressing Information:    tape     - Frequency:    Change dressing every other day and PRN (prn)   10/16/24 1852 OP Wound Dressing Routine Completed Melany Hathaway APRN     - Cleansing:    Cleanse with normal saline or wound cleanser     - Cleansing:    Cleanse with Vashe     - Dressing:    Hydrofera transfer     - Dressing:    Dry gauze     - Dressing:    Conforming roll     - Frequency:    Change dressing two times per week (and prn)   09/19/24 1831 OP Wound Dressing Routine Completed Melany Hathaway APRN     - Cleansing:    Cleanse with normal saline or wound cleanser     - Cleansing:    Cleanse with Vashe     - Dressing:    Honey gel     - Dressing:    Dry gauze     - Dressing:    Kerlix     - Dressing:    Paper tape     - Frequency:    Change dressing every other day and PRN           Procedures  ASSESSMENT AND PLAN:      1. Diabetic ulcer of left lower leg associated with diabetes mellitus due to underlying condition, limited to breakdown of skin (HCC)    2. Type 2 diabetes mellitus with other circulatory complication, with  long-term current use of insulin (HCC)      F/u visit on chronic wounds on lateral left lower leg and small wound on right medial ankle. HARJINDER 0.5 on left side, 0.7 on right side. Followed by Dr. 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 Surgical intervention was canceled due to good improvement in wound healing prior to the planned intervention.   The wound on the right medial ankle has healed. The left lateral ankle wounds are nearly healed as well-the more distal wound has a pinpoint opening and the proximal wound is small and superficial. Periwound clean and dry. No s/sx of infection.   Wound Care: Continue using Medi-honey, cover with gauze, change every 3-4 days.   Continue to keep dressing dry. Wear cast cover if showering until wound completely healed.     Will discharge from wound care at this time given that the wounds are nearly healed. Advised to return if the wounds fail to fully heal in the next 4 weeks or if they worsen in any way.     Continue nutrition for wound healing, encourages reduced carbohydrate intake, increased protein intake, and healthy diet. Recommended increase vitamin intake (either with foods or vitamin supplements), need vitamin A, Bs, C, D and zinc for wound healing.   Continue to work on improving blood sugar control  Risks, benefits, and alternatives of current treatment plan discussed in detail.  Questions and concerns addressed. Red flags to RTC or ED reviewed.  Patient agrees to plan.      Return Discharge from wound care clinic.    I spent 24 minutes with the patient. This time included:  preparing to see the patient (eg, review notes and recent diagnostics), seeing the patient, performing a medically appropriate examination and/or evaluation, counseling and educating the patient, and documenting in the record.    NOTE TO PATIENT: The 21st Century Cures Act makes clinical notes like these available to patients in the  interest of transparency. Clinical notes are medical documents used by physicians and care providers to communicate with each other. These documents include medical language and terminology, abbreviations, and treatment information that may sound technical and at times possibly unfamiliar. In addition, at times, the verbiage may appear blunt or direct. These documents are one tool providers use to communicate relevant information and clinical opinions of the care providers in a way that allows common understanding of the clinical context.

## 2024-12-26 ENCOUNTER — TELEPHONE (OUTPATIENT)
Dept: WOUND CARE | Facility: HOSPITAL | Age: 74
End: 2024-12-26

## 2024-12-26 NOTE — TELEPHONE ENCOUNTER
Called the patient who requested an order for hydrofera blue transfer. Submitted the order to [facility].

## (undated) DEVICE — TOWEL,OR,DSP,ST,BLUE,DLX,2/PK,40PK/CS: Brand: MEDLINE

## (undated) DEVICE — CONTAINER,SPECIMEN,OR STERILE,4OZ: Brand: MEDLINE

## (undated) DEVICE — COVER CAMERA LIGHT HANDLE

## (undated) DEVICE — SUT PROL 6-0 30IN C-1 NABSRB BLU 13MM 3/8 CIR

## (undated) DEVICE — SUT PERMA- 3-0 18IN NABSRB BLK TIE

## (undated) DEVICE — SHEET,DRAPE,70X100,STERILE: Brand: MEDLINE

## (undated) DEVICE — 3M™ IOBAN™ 2 ANTIMICROBIAL INCISE DRAPE 6650EZ: Brand: IOBAN™ 2

## (undated) DEVICE — INTENDED TO BE USED TO OCCLUDE, RETRACT AND IDENTIFY ARTERIES, VEINS, TENDONS AND NERVES IN SURGICAL PROCEDURES: Brand: STERION®  VESSEL LOOP

## (undated) DEVICE — HANDLE SUR BLU PLAS LT FLX SLIP ON ST DISP

## (undated) DEVICE — SPONGE LAP 18X18IN WHT COT 4 PLY FLD STRUNG

## (undated) DEVICE — SUCTION CANISTER, 3000CC,SAFELINER: Brand: DEROYAL

## (undated) DEVICE — APPLICATOR PREP 26ML CHG 2% ISO ALC 70%

## (undated) DEVICE — SOLUTION IRRIG 1000ML 0.9% NACL USP BTL

## (undated) DEVICE — SUT PERMA- 3-0 30IN SH NABSRB BLK 26MM 1/2

## (undated) DEVICE — STANDARD HYPODERMIC NEEDLE,POLYPROPYLENE HUB: Brand: MONOJECT

## (undated) DEVICE — TOWEL,OR,DSP,ST,WHITE,DLX,2/PK,40PK/CS: Brand: MEDLINE

## (undated) DEVICE — 3M™ COBAN™ NL STERILE NON-LATEX SELF-ADHERENT WRAP, 2084S, 4 IN X 5 YD (10 CM X 4,5 M), 18 ROLLS/CASE: Brand: 3M™ COBAN™

## (undated) DEVICE — INDICATED FOR SURGICAL CLAMPING DURING CARDIOVASCULAR PERIPHERAL VASCULAR, AND GENERAL SURGERY.: Brand: SOFT/FIBRA® SPRING CLIP

## (undated) DEVICE — SUT PERMA- 2-0 18IN NABSRB BLK TIE SILK

## (undated) DEVICE — SOLUTION IV 1000ML 0.9% NACL PRESERVATIVE

## (undated) DEVICE — SUT PROL 5-0 36IN C-1 NABSRB BLU 13MM 3/8 CIR

## (undated) DEVICE — CV: Brand: MEDLINE INDUSTRIES, INC.

## (undated) DEVICE — SUT PERMA- 4-0 18IN NABSRB BLK TIE SILK

## (undated) DEVICE — SLIM BODY SKIN STAPLER: Brand: APPOSE ULC

## (undated) DEVICE — GAMMEX® PI HYBRID SIZE 8, STERILE POWDER-FREE SURGICAL GLOVE, POLYISOPRENE AND NEOPRENE BLEND: Brand: GAMMEX

## (undated) DEVICE — TRAY CATH FOLEY 16FR INCLUDE BARDX IC COMPLT CARE

## (undated) DEVICE — SUT PROL 7-0 30IN BV-1 NABSRB BLU 9.3MM 3/8 C

## (undated) DEVICE — ANTIBACTERIAL VIOLET BRAIDED (POLYGLACTIN 910), SYNTHETIC ABSORBABLE SUTURE: Brand: COATED VICRYL

## (undated) DEVICE — SHEET,DRAPE,53X77,STERILE: Brand: MEDLINE

## (undated) DEVICE — SUT VCRL 2-0 36IN CT-1 ABSRB UD L36MM 1/2 CIR

## (undated) DEVICE — IMPERVIOUS STOCKINETTE: Brand: DEROYAL

## (undated) NOTE — LETTER
Date: 9/5/2024  Patient name: Guillermo White  YOB: 1950  Medical Record Number: [id number]  Primary Coverage: Payor: MEDICAID / Plan: MEDICAID / Product Type: *No Product type* /   Secondary Coverage: MEDICAID - MEDICAID  Insurance ID: [id number]  Patient Address: [address] [place] [address]  Telephone Information:   Home Phone [phone]   Mobile [phone]     Encounter Date: 9/5/2024  Provider: STEPHANIE Castellanos  Diagnosis:     ICD-10-CM   1. Non-pressure chronic ulcer of left lower leg with fat layer exposed ([facility])  L97.922   2. Uncontrolled type 2 diabetes mellitus with hyperglycemia, with long-term current use of insulin ([facility])  E11.65    Z79.4     Progress Note:  Chief Complaint   Patient presents with    Wound Recheck     Left lateral ankle. Pt Bs are at 120      HPI:   9/5/24: F/u visit on left lateral ankle wounds. Language line translated for visit. Pt tolerated dressing well, but felt dressing was tight. He says his pain varies to little pain up to 7/10. No change in health or new concerns.    8/29/24: 74 yr old male here today with his daughter for evaluation of left lateral ankle wounds. They are [place]ene[place]in speaking, language line was used for translation. Reports wounds have been present for at least 6 months. He went to [place] ED 8/6/24 for evaluation of the wounds. He was treated for wound infection with a course of Augmentin 875mg. Pt says the redness around the wound has improved since then. He was previously receiving treatment for the wound in [place]. He has tried medi-honey and what appears to be the equivalent to Santyl. Reports both treatments caused increased pain at the wound site. Since he was at the ED, his daughter has been cleaning with an OTC wound cleanser and wounds have been left open to air. He has been s howering regularly. Additionally, he has wound on his left great toe that they think has healed, occasionally she notices a drop of drainage. Pt  reports he currently has no pain at the wound site, but will experience sharp pain at the wound site at night time. He was given Gabapentin by [facility], but this has not helped. He denies known history of stroke, blood clots, CHF, PAD, Afib. Reports he takes Plavix for poor circulation. Reports taking Insulin as prescribed.      Medical Problems:  T2DM (uncontrolled)  HTN  Hyperlipidemia    Pertinent Labs:     Ref Range & Units 8/5/24 2200   WBC  3.5 - 10.5 K/UL 5.1   RBC  4.20 - 5.80 M/UL 4.65   HGB  13.0 - 17.5 GM/DL 13.5   HCT  38.0 - 54.0 % 40.9     BUN  7 - 22 MG/DL 25 High    CREATININE  0.6 - 1.4 MG/DL 1.07   GLUCOSE  70 - 100 MG/ High      ESTIMATED GFR  >59 ML/MIN/1.73M2 73     SED RATE  0 - 25 MM 19     C REACTIVE PROTEIN  <8.1 MG/L 2.0     Outside Imaging:  EXAM: AP and lateral views of the left tibia and fibula   DATE: 8/5/2024 11:49 PM   CLINICAL INDICATION: ulceration two months, treated in [place], seems worse to   family.   COMPARISON: None.   FINDINGS:   Bones:There is no acute fracture or dislocation.   Joints: Joint spaces are intact without degenerative proliferation.   Soft Tissues: Soft tissue ulceration over the lateral aspect of the distal lower   extremity.   IMPRESSION:   No acute fracture or dislocation of the left tibia or fibula.  No osseous   erosion.  Soft tissue ulceration distally.     EXAM: Non-Standing AP, lateral and oblique views of the left foot   DATE: 8/5/2024 11:49 PM   CLINICAL INDICATION: ulcerations left foot at base of great toe and left lower   leg distally/laterally.    COMPARISON: None.   FINDINGS:   Bones: There is no acute fracture or dislocation.    Joints: Joint spaces show mild degenerative proliferation.   Soft Tissues: Focal soft tissue swelling at the medial aspect of the hallux   distal phalanx.     IMPRESSION:   No acute fracture-dislocation left foot.  No osseous erosion.  Soft tissue   swelling.       Current Outpatient Medications:     amLODIPine 5 MG  Oral Tab, Take 1 tablet (5 mg total) by mouth daily., Disp: , Rfl:     clopidogrel 75 MG Oral Tab, Take 1 tablet (75 mg total) by mouth daily., Disp: , Rfl:     ergocalciferol 1.25 MG (48583 UT) Oral Cap, Take 1 capsule (50,000 Units total) by mouth once a week., Disp: , Rfl:     gabapentin 100 MG Oral Cap, Take 1 capsule (100 mg total) by mouth 3 (three) times daily., Disp: , Rfl:     LANTUS SOLOSTAR 100 UNIT/ML Subcutaneous Solution Pen-injector, Inject 66 Units into the skin daily., Disp: , Rfl:     Insulin Lispro, 1 Unit Dial, 100 UNIT/ML Subcutaneous Solution Pen-injector, Inject 8 Units as directed 3 (three) times daily., Disp: , Rfl:     BD PEN NEEDLE SHANNON 2ND GEN 32G X 4 MM Does not apply Misc, 1 each 4 (four) times daily., Disp: , Rfl:     losartan-hydroCHLOROthiazide 100-25 MG Oral Tab, Take 1 tablet by mouth daily., Disp: , Rfl:     rosuvastatin 40 MG Oral Tab, Take 1 tablet (40 mg total) by mouth nightly., Disp: , Rfl:     Allergies:  No Known Allergies     REVIEW OF SYSTEMS:   Review of Systems   Constitutional:  Negative for activity change, appetite change, chills, fatigue and fever.   Respiratory:  Negative for shortness of breath.    Cardiovascular:  Negative for chest pain.   Gastrointestinal:  Negative for abdominal pain.   Musculoskeletal:  Positive for gait problem (ambulates with cane).   Skin:  Positive for wound. Negative for rash.   Neurological:  Negative for dizziness, numbness and headaches.   Psychiatric/Behavioral:          Daughter reports he sometimes gets depressed by non-healing wound     PHYSICAL EXAM:   BP (!) 152/91   Pulse 79   Temp 98.1 °F (36.7 °C)   Resp 18   SpO2 94%    Estimated body mass index is 26.63 kg/m² as calculated from the following:    Height as of 8/29/24: 67\".    Weight as of 8/29/24: 170 lb.   Vital signs reviewed.Appears stated age, well groomed.    Physical Exam  Constitutional:       General: He is not in acute distress.     Appearance: Normal  appearance. He is not ill-appearing.   HENT:      Head: Normocephalic and atraumatic.   Cardiovascular:      Rate and Rhythm: Normal rate.      Pulses:           Dorsalis pedis pulses are 2+ on the right side and 2+ on the left side.        Posterior tibial pulses are 2+ on the right side and 2+ on the left side.      Comments: 24 handheld doppler findings of biphasic wave sounds at right DP and PT pulses, monophasic wave sounds left DP and PT.    Pulmonary:      Effort: Pulmonary effort is normal.   Musculoskeletal:      Right lower leg: Edema present.      Left lower le+ Pitting Edema present.   Skin:     General: Skin is warm and dry.      Capillary Refill: Capillary refill takes 2 to 3 seconds.      Findings: Wound (left lateral ankle, left 1st toe; see wound care flowsheet) present. No rash.   Neurological:      General: No focal deficit present.      Mental Status: He is alert and oriented to person, place, and time.   Psychiatric:         Mood and Affect: Mood normal.         Behavior: Behavior normal.       Wound 24 3 Ankle Left;Lateral;Proximal (Active)   Date First Assessed/Time First Assessed: 24 1106    Wound Number (Wound Clinic Only): 3  Location: Ankle  Wound Location Orientation: Left;Lateral;Proximal      Assessments 2024 11:16 AM 2024  1:29 PM   Wound Image        Site Assessment Moist;Yellow Moist;Yellow;Red   Closure Not approximated Not approximated   Drainage Amount Scant Scant   Drainage Description Yellow Yellow   Treatments Cleansed;Wound ;Saline Cleansed;Saline;Vashe;Honey Gel   Dressing Joanne;Tape 4x4s;Gauze;Joanne;Tape   Dressing Changed New Changed   Dressing Status Dry;Intact;Clean Dry;Intact;Clean   Wound Length (cm) 1.9 cm 2.1 cm   Wound Width (cm) 1.6 cm 1.5 cm   Wound Surface Area (cm^2) 3.04 cm^2 3.15 cm^2   Wound Depth (cm) 0.1 cm 0.2 cm   Wound Volume (cm^3) 0.304 cm^3 0.63 cm^3   Wound Healing % -- -107   Margins Attached edges Attached edges    Non-staged Wound Description Full thickness Full thickness   Angelica-wound Assessment Clean;Dry;Blanchable erythema;Painful Clean;Dry;Blanchable erythema;Painful   Wound Granulation Tissue -- Red   Wound Bed Granulation (%) 10 % 10 %   Wound Bed Epithelium (%) 0 % 0 %   Wound Bed Slough (%) 90 % 90 %   Wound Bed Eschar (%) 0 % 0 %   Wound Bed Fibrin (%) 0 % 0 %   State of Healing Non-healing Non-healing   Wound Odor None None       Active Orders   Date Order Priority Status Authorizing Provider   08/29/24 1421 OP Wound Dressing Routine Active Melany Hathaway APRN     - Cleansing:    Cleanse with normal saline or wound cleanser     - Dressing:    Enluxtra humidifiber     - Dressing:    Conforming roll     - Frequency:    Change dressing two times per week       Wound 08/29/24 1 Ankle Left;Lateral;Distal (Active)   Date First Assessed/Time First Assessed: 08/29/24 1115    Wound Number (Wound Clinic Only): 1  Location: (c) Ankle  Wound Location Orientation: Left;Lateral;Distal      Assessments 8/29/2024 11:16 AM 9/5/2024  1:29 PM   Wound Image        Site Assessment Moist;Yellow;Pink;Red Moist;Yellow;Pink   Closure Not approximated Not approximated   Drainage Amount Small Small   Drainage Description Yellow Yellow   Treatments Cleansed;Saline;Wound  Cleansed;Saline;Vashe;Honey Gel   Dressing Joanne;Tape 4x4s;Gauze;Joanne;Tape   Dressing Changed New Changed   Dressing Status Clean;Dry;Intact Clean;Dry;Intact   Wound Length (cm) 1.4 cm 1.5 cm   Wound Width (cm) 1.3 cm 1.3 cm   Wound Surface Area (cm^2) 1.82 cm^2 1.95 cm^2   Wound Depth (cm) 0.2 cm 0.2 cm   Wound Volume (cm^3) 0.364 cm^3 0.39 cm^3   Wound Healing % -- -7   Margins Well-defined edges Well-defined edges   Non-staged Wound Description Full thickness Full thickness   Angelica-wound Assessment Dry;Intact;Blanchable erythema;Painful Dry;Intact;Blanchable erythema;Painful   Wound Granulation Tissue Pink;Red Pink   Wound Bed Granulation (%) 100 % 50 %   Wound  Bed Epithelium (%) 0 % 0 %   Wound Bed Slough (%) 20 % 50 %   Wound Bed Eschar (%) 0 % 0 %   Wound Bed Fibrin (%) 0 % 0 %   State of Healing Non-healing Non-healing   Wound Odor None None       Active Orders   Date Order Priority Status Authorizing Provider   08/29/24 1421 OP Wound Dressing Routine Active Melany Hathaway APRN     - Cleansing:    Cleanse with normal saline or wound cleanser     - Dressing:    Enluxtra humidifiber     - Dressing:    Conforming roll     - Frequency:    Change dressing two times per week       Inactive Orders   Date Order Priority Status Authorizing Provider   09/05/24 1400 Debridement Left;Lateral;Distal Ankle Routine Completed Melany Hathaway APRN   08/29/24 1416 Debridement Left;Lateral;Distal Ankle Routine Completed Melany Hathaway APRN       Wound 08/29/24 2 Toe (Comment which one) Left;Dorsal (Active)   Date First Assessed/Time First Assessed: 08/29/24 1132    Wound Number (Wound Clinic Only): 2  Location: Toe (Comment which one)  Wound Location Orientation: Left;Dorsal      Assessments 8/29/2024 11:16 AM 9/5/2024  1:29 PM   Wound Image       Site Assessment Yellow;Moist;Pink Clean;Dry;Intact   Closure Not approximated Approximated   Drainage Amount Small None   Drainage Description Yellow --   Treatments Cleansed;Saline;Wound  Cleansed;Saline;Wound    Dressing Xeroform;Bandaid Open to air   Dressing Changed New --   Dressing Status Clean;Dry;Intact --   Wound Length (cm) 0.1 cm 0 cm   Wound Width (cm) 0.1 cm 0 cm   Wound Surface Area (cm^2) 0.01 cm^2 0 cm^2   Wound Depth (cm) 0.1 cm 0 cm   Wound Volume (cm^3) 0.001 cm^3 0 cm^3   Wound Healing % -- 100   Margins Attached edges Flat and Intact   Non-staged Wound Description -- Not applicable   Angelica-wound Assessment Clean;Dry;Maceration Dry;Intact;Clean   Wound Granulation Tissue Pink --   Wound Bed Granulation (%) 80 % 0 %   Wound Bed Epithelium (%) 0 % 100 %   Wound Bed Slough (%) 20 % 0 %   Wound Bed Eschar  (%) 0 % 0 %   Wound Bed Fibrin (%) 0 % 0 %   State of Healing Non-healing Epithelialized;Closed wound edges   Wound Odor None None       Compression Wrap 08/29/24 Leg Left;Lower (Active)   Placement Date: 08/29/24   Location: Leg  Wound Location Orientation: Left;Lower      Assessments 8/29/2024 11:16 AM 9/5/2024  1:29 PM   Response to Treatment Well tolerated Well tolerated   Compression Layers Single Single   Compression Product Type Tubigrip/Spanda Tubigrip/Spanda   Dressing Applied Yes Yes   Compression Wrap Location Toes to Knee Toes to Knee   Compression Wrap Status Clean;Dry;Intact Clean;Dry;Intact           Lower Extremity Measurements   Calf Ankle Foot Heel to Knee Knee to Thigh   Right                                Left Left Calf from:: Heel  Calf Left cm:: 37.6 Left Ankle from:: Heel  Left Ankle cm:: 22    Left Foot from:: Great toe  Left Foot cm:: 24.5            Procedures  ASSESSMENT AND PLAN:      1. Non-pressure chronic ulcer of left lower leg with fat layer exposed (HCC)  - US ANKLE BRACHIAL INDEX (CPT=93922); Future    2. Uncontrolled type 2 diabetes mellitus with hyperglycemia, with long-term current use of insulin ([facility]CC)  - US ANKLE BRACHIAL INDEX (CPT=93922); Future    Chronic full thickness wounds on lateral left lower leg, present for ~6 months. Wounds are dry with adherent slough and small amount granulation present in wound bed. Measurements have slightly increased in size. Periwound with blanchable erythema, edema. Wounds were conservatively debrided (see procedure notes). Following debridement wounds were cleansed with Vashe. There are no s/sx of skin infection--pt was advised to monitor for these and to go to ED if he develops any sx. Hindering factors for wound healing include T2DM, chronic wound, peripheral edema.   Wound Care:  -Cleanse wound with wound cleanser  -Medi-honey and gauze dressing, change Q 3 days and PRN  -Medigrip E for edema management    Continue nutrition for wound  healing, encourages reduced carbohydrate intake, increased protein intake, and healthy diet. Recommended increase vitamin intake (either with foods or vitamin supplements), need vitamin A, Bs, C, D and zinc for wound healing.   Continue to work on improving blood sugar control  Risks, benefits, and alternatives of current treatment plan discussed in detail.  Questions and concerns addressed. Red flags to RTC or ED reviewed.  Patient (or parent) agrees to plan.      Return in about 1 week (around 9/12/2024) for APN visit x 30 mins.    I spent 30 minutes with the patient. This time included:  preparing to see the patient (eg, review notes and recent diagnostics), seeing the patient, performing a medically appropriate examination and/or evaluation, counseling and educating the patient, and documenting in the record.    NOTE TO PATIENT: The 21st Century Cures Act makes clinical notes like these available to patients in the interest of transparency. Clinical notes are medical documents used by physicians and care providers to communicate with each other. These documents include medical language and terminology, abbreviations, and treatment information that may sound technical and at times possibly unfamiliar. In addition, at times, the verbiage may appear blunt or direct. These documents are one tool providers use to communicate relevant information and clinical opinions of the care providers in a way that allows common understanding of the clinical context.         Patient ID: Guillermo White is a 74 year old male.    Debridement Left;Lateral;Distal Ankle   Wound 08/29/24 1 Ankle Left;Lateral;Distal    Performed by: Melany Hathaway APRN  Authorized by: Melany Hathaway APRN      Consent   Consent obtained? verbal  Consent given by: patient  Risks discussed? procedural risks discussed  Time out called at 9/5/2024 2:00 PM  Immediately prior to the procedure a time out was called and the performing provider verified  the correct patient, procedure, equipment, support staff, and site/side marked as required.    Debridement Details  Performed by: NP  Debridement type: conservative sharp  Pain control: benzocaine 20%  Pain control administration type: topical    Pre-debridement measurements  Length (cm): 1.5  Width (cm): 1.3  Depth (cm): 0.2  Surface Area (cm^2): 1.95    Post-debridement measurements  Length (cm): 1.5  Width (cm): 1.3  Depth (cm): 0.2  Percent debrided: 30%  Surface Area (cm^2): 1.95  Area Debrided (cm^2): 0.59  Volume (cm^3): 0.39    Devitalized tissue debrided: slough  Instrument(s) utilized: curette  Bleeding: small  Hemostasis obtained with: pressure  Procedural pain (0-10): 6  Post-procedural pain: 7   Response to treatment: procedure was tolerated well    Debridement Left;Lateral;Proximal Ankle   Wound 08/29/24 3 Ankle Left;Lateral;Proximal    Performed by: Melany Hathaway APRN  Authorized by: Melany Hathaway APRN      Consent   Consent obtained? verbal  Consent given by: patient  Risks discussed? procedural risks discussed  Time out called at 9/5/2024 2:00 PM  Immediately prior to the procedure a time out was called and the performing provider verified the correct patient, procedure, equipment, support staff, and site/side marked as required.    Debridement Details  Performed by: NP  Debridement type: conservative sharp  Pain control: benzocaine 20%  Pain control administration type: topical    Pre-debridement measurements  Length (cm): 2.1  Width (cm): 1.5  Depth (cm): 0.2  Surface Area (cm^2): 3.15    Post-debridement measurements  Length (cm): 2.1  Width (cm): 1.5  Depth (cm): 0.2  Percent debrided: 30%  Surface Area (cm^2): 3.15  Area Debrided (cm^2): 0.95  Volume (cm^3): 0.63    Devitalized tissue debrided: slough  Instrument(s) utilized: curette  Bleeding: small  Hemostasis obtained with: pressure  Procedural pain (0-10): 7  Post-procedural pain: 7   Response to treatment: procedure was tolerated  well      Wound Information/Order:  Wound Number: 1  and 3  Product:medihoney gel,4x4 dry gauze, conforming roll or alf, tape  Dispense: 15 days  Dressing Frequency:Change dressingevery 3 days and prn    Was a Debridement performed: Yes, Debridement type: selective    NOTE: Please notify patient of any out of pocket costs prior to sending supplies      REJI Castellanos  NPI [id number]